# Patient Record
Sex: MALE | Race: WHITE | NOT HISPANIC OR LATINO | URBAN - METROPOLITAN AREA
[De-identification: names, ages, dates, MRNs, and addresses within clinical notes are randomized per-mention and may not be internally consistent; named-entity substitution may affect disease eponyms.]

---

## 2018-05-12 ENCOUNTER — OUTPATIENT (OUTPATIENT)
Dept: OUTPATIENT SERVICES | Facility: HOSPITAL | Age: 73
LOS: 1 days | Discharge: HOME | End: 2018-05-12

## 2018-05-12 DIAGNOSIS — J44.9 CHRONIC OBSTRUCTIVE PULMONARY DISEASE, UNSPECIFIED: ICD-10-CM

## 2018-05-12 LAB
BASE EXCESS BLDA CALC-SCNC: 5.9 MMOL/L — HIGH (ref -2–2)
BLOOD GAS COMMENTS: SIGNIFICANT CHANGE UP
BLOOD GAS SOURCE: SIGNIFICANT CHANGE UP
HCO3 BLDA-SCNC: 31 MMOL/L — HIGH (ref 21–29)
HOROWITZ INDEX BLDA+IHG-RTO: 32 — SIGNIFICANT CHANGE UP
PCO2 BLDA: 49 MMHG — HIGH (ref 32–46)
PH BLD: 7.42 — SIGNIFICANT CHANGE UP (ref 7.35–7.45)
PO2 BLDA: 59 MMHG — LOW (ref 74–108)
SAO2 % BLDA: 91 % — LOW (ref 92–96)

## 2019-04-29 ENCOUNTER — INPATIENT (INPATIENT)
Facility: HOSPITAL | Age: 74
LOS: 6 days | Discharge: NURSING, REHAB OR LONG-TERM CARE | End: 2019-05-06
Attending: INTERNAL MEDICINE | Admitting: INTERNAL MEDICINE
Payer: MEDICARE

## 2019-04-29 VITALS
SYSTOLIC BLOOD PRESSURE: 144 MMHG | DIASTOLIC BLOOD PRESSURE: 84 MMHG | HEART RATE: 115 BPM | OXYGEN SATURATION: 97 % | TEMPERATURE: 98 F | WEIGHT: 164.02 LBS | RESPIRATION RATE: 18 BRPM

## 2019-04-29 LAB
ALBUMIN SERPL ELPH-MCNC: 3 G/DL — LOW (ref 3.3–5)
ALP SERPL-CCNC: 61 U/L — SIGNIFICANT CHANGE UP (ref 40–120)
ALT FLD-CCNC: 9 U/L — LOW (ref 12–78)
ANION GAP SERPL CALC-SCNC: 7 MMOL/L — SIGNIFICANT CHANGE UP (ref 5–17)
APTT BLD: 29.7 SEC — SIGNIFICANT CHANGE UP (ref 27.5–36.3)
AST SERPL-CCNC: 18 U/L — SIGNIFICANT CHANGE UP (ref 15–37)
BASOPHILS # BLD AUTO: 0.03 K/UL — SIGNIFICANT CHANGE UP (ref 0–0.2)
BASOPHILS NFR BLD AUTO: 0.4 % — SIGNIFICANT CHANGE UP (ref 0–2)
BILIRUB SERPL-MCNC: 0.2 MG/DL — SIGNIFICANT CHANGE UP (ref 0.2–1.2)
BUN SERPL-MCNC: 15 MG/DL — SIGNIFICANT CHANGE UP (ref 7–23)
CALCIUM SERPL-MCNC: 8.7 MG/DL — SIGNIFICANT CHANGE UP (ref 8.5–10.1)
CHLORIDE SERPL-SCNC: 104 MMOL/L — SIGNIFICANT CHANGE UP (ref 96–108)
CK MB BLD-MCNC: 5.7 % — HIGH (ref 0–3.5)
CK MB CFR SERPL CALC: 2.7 NG/ML — SIGNIFICANT CHANGE UP (ref 0.5–3.6)
CK SERPL-CCNC: 47 U/L — SIGNIFICANT CHANGE UP (ref 26–308)
CO2 SERPL-SCNC: 33 MMOL/L — HIGH (ref 22–31)
CREAT SERPL-MCNC: 0.88 MG/DL — SIGNIFICANT CHANGE UP (ref 0.5–1.3)
EOSINOPHIL # BLD AUTO: 0.37 K/UL — SIGNIFICANT CHANGE UP (ref 0–0.5)
EOSINOPHIL NFR BLD AUTO: 4.4 % — SIGNIFICANT CHANGE UP (ref 0–6)
GLUCOSE BLDC GLUCOMTR-MCNC: 108 MG/DL — HIGH (ref 70–99)
GLUCOSE SERPL-MCNC: 114 MG/DL — HIGH (ref 70–99)
HCT VFR BLD CALC: 40.1 % — SIGNIFICANT CHANGE UP (ref 39–50)
HGB BLD-MCNC: 12.3 G/DL — LOW (ref 13–17)
IMM GRANULOCYTES NFR BLD AUTO: 0.2 % — SIGNIFICANT CHANGE UP (ref 0–1.5)
INR BLD: 1 RATIO — SIGNIFICANT CHANGE UP (ref 0.88–1.16)
LYMPHOCYTES # BLD AUTO: 0.74 K/UL — LOW (ref 1–3.3)
LYMPHOCYTES # BLD AUTO: 8.8 % — LOW (ref 13–44)
MCHC RBC-ENTMCNC: 28 PG — SIGNIFICANT CHANGE UP (ref 27–34)
MCHC RBC-ENTMCNC: 30.7 GM/DL — LOW (ref 32–36)
MCV RBC AUTO: 91.1 FL — SIGNIFICANT CHANGE UP (ref 80–100)
MONOCYTES # BLD AUTO: 0.63 K/UL — SIGNIFICANT CHANGE UP (ref 0–0.9)
MONOCYTES NFR BLD AUTO: 7.5 % — SIGNIFICANT CHANGE UP (ref 2–14)
NEUTROPHILS # BLD AUTO: 6.58 K/UL — SIGNIFICANT CHANGE UP (ref 1.8–7.4)
NEUTROPHILS NFR BLD AUTO: 78.7 % — HIGH (ref 43–77)
NRBC # BLD: 0 /100 WBCS — SIGNIFICANT CHANGE UP (ref 0–0)
NT-PROBNP SERPL-SCNC: 810 PG/ML — HIGH (ref 0–450)
PLATELET # BLD AUTO: 217 K/UL — SIGNIFICANT CHANGE UP (ref 150–400)
POTASSIUM SERPL-MCNC: 4.3 MMOL/L — SIGNIFICANT CHANGE UP (ref 3.5–5.3)
POTASSIUM SERPL-SCNC: 4.3 MMOL/L — SIGNIFICANT CHANGE UP (ref 3.5–5.3)
PROT SERPL-MCNC: 6.5 GM/DL — SIGNIFICANT CHANGE UP (ref 6–8.3)
PROTHROM AB SERPL-ACNC: 11.2 SEC — SIGNIFICANT CHANGE UP (ref 10–12.9)
RBC # BLD: 4.4 M/UL — SIGNIFICANT CHANGE UP (ref 4.2–5.8)
RBC # FLD: 14.7 % — HIGH (ref 10.3–14.5)
SODIUM SERPL-SCNC: 144 MMOL/L — SIGNIFICANT CHANGE UP (ref 135–145)
TROPONIN I SERPL-MCNC: 0.16 NG/ML — HIGH (ref 0.01–0.04)
WBC # BLD: 8.37 K/UL — SIGNIFICANT CHANGE UP (ref 3.8–10.5)
WBC # FLD AUTO: 8.37 K/UL — SIGNIFICANT CHANGE UP (ref 3.8–10.5)

## 2019-04-29 PROCEDURE — 99285 EMERGENCY DEPT VISIT HI MDM: CPT

## 2019-04-29 PROCEDURE — 71275 CT ANGIOGRAPHY CHEST: CPT | Mod: 26

## 2019-04-29 PROCEDURE — 71045 X-RAY EXAM CHEST 1 VIEW: CPT | Mod: 26

## 2019-04-29 RX ORDER — ENOXAPARIN SODIUM 100 MG/ML
80 INJECTION SUBCUTANEOUS ONCE
Qty: 0 | Refills: 0 | Status: COMPLETED | OUTPATIENT
Start: 2019-04-29 | End: 2019-04-29

## 2019-04-29 RX ORDER — INSULIN LISPRO 100/ML
VIAL (ML) SUBCUTANEOUS
Qty: 0 | Refills: 0 | Status: DISCONTINUED | OUTPATIENT
Start: 2019-04-29 | End: 2019-05-06

## 2019-04-29 RX ORDER — SODIUM CHLORIDE 9 MG/ML
1000 INJECTION, SOLUTION INTRAVENOUS
Qty: 0 | Refills: 0 | Status: DISCONTINUED | OUTPATIENT
Start: 2019-04-29 | End: 2019-05-06

## 2019-04-29 RX ORDER — GLUCAGON INJECTION, SOLUTION 0.5 MG/.1ML
1 INJECTION, SOLUTION SUBCUTANEOUS ONCE
Qty: 0 | Refills: 0 | Status: DISCONTINUED | OUTPATIENT
Start: 2019-04-29 | End: 2019-05-06

## 2019-04-29 RX ORDER — DEXTROSE 50 % IN WATER 50 %
25 SYRINGE (ML) INTRAVENOUS ONCE
Qty: 0 | Refills: 0 | Status: DISCONTINUED | OUTPATIENT
Start: 2019-04-29 | End: 2019-05-06

## 2019-04-29 RX ORDER — INSULIN LISPRO 100/ML
VIAL (ML) SUBCUTANEOUS AT BEDTIME
Qty: 0 | Refills: 0 | Status: DISCONTINUED | OUTPATIENT
Start: 2019-04-29 | End: 2019-05-06

## 2019-04-29 RX ORDER — DEXTROSE 50 % IN WATER 50 %
12.5 SYRINGE (ML) INTRAVENOUS ONCE
Qty: 0 | Refills: 0 | Status: DISCONTINUED | OUTPATIENT
Start: 2019-04-29 | End: 2019-05-06

## 2019-04-29 RX ORDER — DEXTROSE 50 % IN WATER 50 %
15 SYRINGE (ML) INTRAVENOUS ONCE
Qty: 0 | Refills: 0 | Status: DISCONTINUED | OUTPATIENT
Start: 2019-04-29 | End: 2019-05-06

## 2019-04-29 RX ORDER — IPRATROPIUM/ALBUTEROL SULFATE 18-103MCG
3 AEROSOL WITH ADAPTER (GRAM) INHALATION EVERY 6 HOURS
Qty: 0 | Refills: 0 | Status: DISCONTINUED | OUTPATIENT
Start: 2019-04-29 | End: 2019-05-06

## 2019-04-29 RX ADMIN — ENOXAPARIN SODIUM 80 MILLIGRAM(S): 100 INJECTION SUBCUTANEOUS at 22:28

## 2019-04-29 NOTE — ED ADULT NURSE NOTE - NSIMPLEMENTINTERV_GEN_ALL_ED
Implemented All Fall Risk Interventions:  [place] to call system. Call bell, personal items and telephone within reach. Instruct patient to call for assistance. Room bathroom lighting operational. Non-slip footwear when patient is off stretcher. Physically safe environment: no spills, clutter or unnecessary equipment. Stretcher in lowest position, wheels locked, appropriate side rails in place. Provide visual cue, wrist band, yellow gown, etc. Monitor gait and stability. Monitor for mental status changes and reorient to person, place, and time. Review medications for side effects contributing to fall risk. Reinforce activity limits and safety measures with patient and family.

## 2019-04-29 NOTE — ED ADULT NURSE NOTE - OBJECTIVE STATEMENT
pt BIBA with c/o chest pain tachycardic in triage, given SL nitro x 1 0.4 with some relief, 325mg of ASA. Cardiac monitor in place. O2 dependent at home 3-4L

## 2019-04-29 NOTE — ED PROVIDER NOTE - MUSCULOSKELETAL, MLM
Spine appears normal, range of motion is not limited, no muscle or joint tenderness NO pitting edema no pain bilateral legs

## 2019-04-29 NOTE — ED PROVIDER NOTE - CARE PLAN
Principal Discharge DX:	Chest pain Principal Discharge DX:	Chest pain  Secondary Diagnosis:	Troponin level elevated

## 2019-04-29 NOTE — ED ADULT TRIAGE NOTE - CHIEF COMPLAINT QUOTE
pt BIBA with c/o chest pain tachycardic in triage, given SL nitro x 1 0.4 with some relief, 325mg of ASA

## 2019-04-29 NOTE — ED PROVIDER NOTE - PMH
Anxiety and depression    COPD with exacerbation    Hypercholesteremia    Hyperglycemia    Pneumonia

## 2019-04-29 NOTE — ED PROVIDER NOTE - OBJECTIVE STATEMENT
74 years old male here c.o bilateral chest pain sob, and cough with chest congestions for three days. Pt 74 years old male here c.o bilateral chest pain sob, and cough with chest congestions for three days. Pt denies headache, dizziness, blurred visions, light sensitivities, focal/distal weakness or numbness, cough, sob, chest pain, nausea, vomiting, fever, chills, abd pain.

## 2019-04-29 NOTE — ED PROVIDER NOTE - CLINICAL SUMMARY MEDICAL DECISION MAKING FREE TEXT BOX
hx, exam. Pt's labs, ct angio of chest and care are singed out to the next team. hx, exam. Pt's labs, ct angio of chest

## 2019-04-30 DIAGNOSIS — J44.1 CHRONIC OBSTRUCTIVE PULMONARY DISEASE WITH (ACUTE) EXACERBATION: ICD-10-CM

## 2019-04-30 DIAGNOSIS — R74.8 ABNORMAL LEVELS OF OTHER SERUM ENZYMES: ICD-10-CM

## 2019-04-30 DIAGNOSIS — R07.9 CHEST PAIN, UNSPECIFIED: ICD-10-CM

## 2019-04-30 DIAGNOSIS — J18.9 PNEUMONIA, UNSPECIFIED ORGANISM: ICD-10-CM

## 2019-04-30 DIAGNOSIS — E11.9 TYPE 2 DIABETES MELLITUS WITHOUT COMPLICATIONS: ICD-10-CM

## 2019-04-30 DIAGNOSIS — F41.9 ANXIETY DISORDER, UNSPECIFIED: ICD-10-CM

## 2019-04-30 LAB
GLUCOSE BLDC GLUCOMTR-MCNC: 113 MG/DL — HIGH (ref 70–99)
GLUCOSE BLDC GLUCOMTR-MCNC: 118 MG/DL — HIGH (ref 70–99)
GLUCOSE BLDC GLUCOMTR-MCNC: 188 MG/DL — HIGH (ref 70–99)
GLUCOSE BLDC GLUCOMTR-MCNC: 80 MG/DL — SIGNIFICANT CHANGE UP (ref 70–99)
HBA1C BLD-MCNC: 6.2 % — HIGH (ref 4–5.6)
T3 SERPL-MCNC: 94 NG/DL — SIGNIFICANT CHANGE UP (ref 80–200)
T4 AB SER-ACNC: 7.4 UG/DL — SIGNIFICANT CHANGE UP (ref 4.6–12)
TROPONIN I SERPL-MCNC: 0.22 NG/ML — HIGH (ref 0.01–0.04)
TROPONIN I SERPL-MCNC: 0.32 NG/ML — HIGH (ref 0.01–0.04)
TROPONIN I SERPL-MCNC: 0.43 NG/ML — HIGH (ref 0.01–0.04)
TROPONIN I SERPL-MCNC: 0.43 NG/ML — HIGH (ref 0.01–0.04)
TSH SERPL-MCNC: 1.1 UU/ML — SIGNIFICANT CHANGE UP (ref 0.36–3.74)

## 2019-04-30 PROCEDURE — 93010 ELECTROCARDIOGRAM REPORT: CPT

## 2019-04-30 RX ORDER — ATORVASTATIN CALCIUM 80 MG/1
40 TABLET, FILM COATED ORAL AT BEDTIME
Qty: 0 | Refills: 0 | Status: DISCONTINUED | OUTPATIENT
Start: 2019-04-30 | End: 2019-05-06

## 2019-04-30 RX ORDER — CEFTRIAXONE 500 MG/1
1 INJECTION, POWDER, FOR SOLUTION INTRAMUSCULAR; INTRAVENOUS EVERY 24 HOURS
Qty: 0 | Refills: 0 | Status: DISCONTINUED | OUTPATIENT
Start: 2019-05-01 | End: 2019-05-06

## 2019-04-30 RX ORDER — AZITHROMYCIN 500 MG/1
500 TABLET, FILM COATED ORAL DAILY
Qty: 0 | Refills: 0 | Status: DISCONTINUED | OUTPATIENT
Start: 2019-04-30 | End: 2019-05-06

## 2019-04-30 RX ORDER — METOPROLOL TARTRATE 50 MG
25 TABLET ORAL DAILY
Qty: 0 | Refills: 0 | Status: DISCONTINUED | OUTPATIENT
Start: 2019-04-30 | End: 2019-05-06

## 2019-04-30 RX ORDER — ENOXAPARIN SODIUM 100 MG/ML
40 INJECTION SUBCUTANEOUS DAILY
Qty: 0 | Refills: 0 | Status: DISCONTINUED | OUTPATIENT
Start: 2019-04-30 | End: 2019-05-06

## 2019-04-30 RX ORDER — CEFTRIAXONE 500 MG/1
1 INJECTION, POWDER, FOR SOLUTION INTRAMUSCULAR; INTRAVENOUS ONCE
Qty: 0 | Refills: 0 | Status: COMPLETED | OUTPATIENT
Start: 2019-04-30 | End: 2019-04-30

## 2019-04-30 RX ORDER — CEFTRIAXONE 500 MG/1
INJECTION, POWDER, FOR SOLUTION INTRAMUSCULAR; INTRAVENOUS
Qty: 0 | Refills: 0 | Status: DISCONTINUED | OUTPATIENT
Start: 2019-04-30 | End: 2019-05-06

## 2019-04-30 RX ORDER — PANTOPRAZOLE SODIUM 20 MG/1
40 TABLET, DELAYED RELEASE ORAL
Qty: 0 | Refills: 0 | Status: DISCONTINUED | OUTPATIENT
Start: 2019-04-30 | End: 2019-05-06

## 2019-04-30 RX ORDER — SERTRALINE 25 MG/1
25 TABLET, FILM COATED ORAL DAILY
Qty: 0 | Refills: 0 | Status: DISCONTINUED | OUTPATIENT
Start: 2019-04-30 | End: 2019-05-05

## 2019-04-30 RX ORDER — ALPRAZOLAM 0.25 MG
0.5 TABLET ORAL THREE TIMES A DAY
Qty: 0 | Refills: 0 | Status: DISCONTINUED | OUTPATIENT
Start: 2019-04-30 | End: 2019-05-06

## 2019-04-30 RX ADMIN — Medication 3 MILLILITER(S): at 23:37

## 2019-04-30 RX ADMIN — SERTRALINE 25 MILLIGRAM(S): 25 TABLET, FILM COATED ORAL at 11:10

## 2019-04-30 RX ADMIN — ENOXAPARIN SODIUM 40 MILLIGRAM(S): 100 INJECTION SUBCUTANEOUS at 11:10

## 2019-04-30 RX ADMIN — Medication 25 MILLIGRAM(S): at 18:48

## 2019-04-30 RX ADMIN — CEFTRIAXONE 100 GRAM(S): 500 INJECTION, POWDER, FOR SOLUTION INTRAMUSCULAR; INTRAVENOUS at 10:12

## 2019-04-30 RX ADMIN — Medication 40 MILLIGRAM(S): at 15:21

## 2019-04-30 RX ADMIN — Medication 3 MILLILITER(S): at 05:58

## 2019-04-30 RX ADMIN — Medication 3 MILLILITER(S): at 11:19

## 2019-04-30 RX ADMIN — Medication 3 MILLILITER(S): at 00:45

## 2019-04-30 RX ADMIN — Medication 40 MILLIGRAM(S): at 21:20

## 2019-04-30 RX ADMIN — AZITHROMYCIN 500 MILLIGRAM(S): 500 TABLET, FILM COATED ORAL at 11:10

## 2019-04-30 RX ADMIN — Medication 3 MILLILITER(S): at 17:43

## 2019-04-30 RX ADMIN — ATORVASTATIN CALCIUM 40 MILLIGRAM(S): 80 TABLET, FILM COATED ORAL at 21:20

## 2019-04-30 RX ADMIN — Medication 0.5 MILLIGRAM(S): at 21:20

## 2019-04-30 NOTE — H&P ADULT - NSHPLABSRESULTS_GEN_ALL_CORE
LABS:                        12.3   8.37  )-----------( 217      ( 29 Apr 2019 18:48 )             40.1     04-29    144  |  104  |  15  ----------------------------<  114<H>  4.3   |  33<H>  |  0.88    Ca    8.7      29 Apr 2019 18:48    TPro  6.5  /  Alb  3.0<L>  /  TBili  0.2  /  DBili  x   /  AST  18  /  ALT  9<L>  /  AlkPhos  61  04-29    PT/INR - ( 29 Apr 2019 18:48 )   PT: 11.2 sec;   INR: 1.00 ratio         PTT - ( 29 Apr 2019 18:48 )  PTT:29.7 sec

## 2019-04-30 NOTE — CONSULT NOTE ADULT - SUBJECTIVE AND OBJECTIVE BOX
Patient is a 74y old  Male who presents with a chief complaint of chest  pain  copd  excerrbation (30 Apr 2019 08:16)    HPI:  74 year male with HLD, DM, COPD on home O2, Anxiety and depression. Came with 2 days of sob and chest pain with cough at times. Also reports chills but denies fever, thinks may have started as cold. Admitted with RLL pneumonia and elevated troponin.  Smoked close to 50 years, quit 10 years ago.    PAST MEDICAL & SURGICAL HISTORY:  Hyperglycemia  COPD with exacerbation  Hypercholesteremia  Pneumonia  Anxiety and depression  No significant past surgical history    FAMILY HISTORY: denies    SOCIAL HISTORY: BMI (kg/m2): 24.3 .    Allergies  No Known Allergies    MEDICATIONS  (STANDING):  ALBUTerol/ipratropium for Nebulization 3 milliLiter(s) Nebulizer every 6 hours  atorvastatin 40 milliGRAM(s) Oral at bedtime  azithromycin   Tablet 500 milliGRAM(s) Oral daily  cefTRIAXone   IVPB      dextrose 5%. 1000 milliLiter(s) (50 mL/Hr) IV Continuous <Continuous>  dextrose 50% Injectable 12.5 Gram(s) IV Push once  dextrose 50% Injectable 25 Gram(s) IV Push once  dextrose 50% Injectable 25 Gram(s) IV Push once  enoxaparin Injectable 40 milliGRAM(s) SubCutaneous daily  insulin lispro (HumaLOG) corrective regimen sliding scale   SubCutaneous three times a day before meals  insulin lispro (HumaLOG) corrective regimen sliding scale   SubCutaneous at bedtime  methylPREDNISolone sodium succinate Injectable 40 milliGRAM(s) IV Push three times a day  metoprolol succinate ER 25 milliGRAM(s) Oral daily  pantoprazole    Tablet 40 milliGRAM(s) Oral before breakfast  sertraline 25 milliGRAM(s) Oral daily    MEDICATIONS  (PRN):  ALPRAZolam 0.5 milliGRAM(s) Oral three times a day PRN anxiety  dextrose 40% Gel 15 Gram(s) Oral once PRN Blood Glucose LESS THAN 70 milliGRAM(s)/deciliter  glucagon  Injectable 1 milliGRAM(s) IntraMuscular once PRN Glucose LESS THAN 70 milligrams/deciliter    REVIEW OF SYSTEMS:    Constitutional:            No fever, weight loss or fatigue  HEENT:                     No difficulty hearing, tinnitus, vertigo; No sinus or throat pain  Respiratory:               sob and cough.  Cardiovascular:          chest pain,   Gastrointestinal:        No abdominal or epigastric pain. No N/V/diarrhea or hematemesis  Genitourinary:            No dysuria, frequency, hematuria or incontinence  SKIN:                         no rash  Musculoskeletal:        No joint pain or swelling  Extremities:                No swelling  Neurological:              headaches  Psychiatric:                 depression, anxiety history.    MACRA & MIPS:  Vaccines - Influenza: yes and Pneumovax: yes  Tobacco: Ex smoker  Depression:  history  Colorectal Screening:  Blood Pressure Screening / Control of: 162/94  Current Medications Reviewed:  yes    Vital Signs Last 24 Hrs  T(C): 36.4 (30 Apr 2019 05:35), Max: 37.3 (29 Apr 2019 23:54)  T(F): 97.6 (30 Apr 2019 05:35), Max: 99.2 (29 Apr 2019 23:54)  HR: 102 (30 Apr 2019 06:29) (102 - 115)  BP: 139/80 (30 Apr 2019 05:35) (139/80 - 168/92)  BP(mean): --  RR: 18 (30 Apr 2019 05:35) (18 - 20)  SpO2: 98% (30 Apr 2019 06:29) (97% - 99%)    PHYSICAL EXAM:  GEN:         Awake, responsive and comfortable.  HEENT:    Normal.    RESP:       decreased air entry.  CVS:         Regular rate and rhythm.   ABD:         Soft, non-tender, non-distended;   :             No costovertebral angle tenderness  SKIN:           Warm and dry.  EXTR:            No clubbing, cyanosis or edema  CNS:              Intact sensory and motor function.  PSYCH:        cooperative, no anxiety or depression    LABS:                        12.3   8.37  )-----------( 217      ( 29 Apr 2019 18:48 )             40.1     04-29    144  |  104  |  15  ----------------------------<  114<H>  4.3   |  33<H>  |  0.88    Ca    8.7      29 Apr 2019 18:48    TPro  6.5  /  Alb  3.0<L>  /  TBili  0.2  /  DBili  x   /  AST  18  /  ALT  9<L>  /  AlkPhos  61  04-29    PT/INR - ( 29 Apr 2019 18:48 )   PT: 11.2 sec;   INR: 1.00 ratio      PTT - ( 29 Apr 2019 18:48 )  PTT:29.7 sec    EKG: sinus rhythm    RADIOLOGY & ADDITIONAL STUDIES:  < from: CT Angio Chest w/ IV Cont (04.29.19 @ 21:36) >  ******PRELIMINARY REPORT******    ******PRELIMINARY REPORT******          EXAM:  CT ANGIO CHEST (W)AW IC                          PROCEDURE DATE:  04/29/2019    ******PRELIMINARY REPORT******    ******PRELIMINARY REPORT******          INTERPRETATION:  [facility] PRELIMINARY REPORT    EXAM:    CT Angiography Chest With Contrast     EXAM DATE/TIME:    4/29/2019 8:51 PM     CLINICAL HISTORY:    74 years old, male; Signs and symptoms; Shortness of breath     TECHNIQUE:    Imaging protocol: Axial computed tomographic angiography images of the   chest   with intravenous contrast using CT angiography protocol. Coronal and   sagittal   reformatted images were created and reviewed.    3D rendering: MIP reconstructed images were created and reviewed.    Radiation optimization: All CT scans at this facility use least one of   these   dose optimization techniques: automated exposure control; mA and/or kV   adjustment per patient size (includes targeted exams where dose is   matched to   clinical indication); or iterative reconstruction.    Contrast material: [id number]; Contrast volume: 90 ml; Contrast route: IV;     COMPARISON:    CT ANGIO CHEST WITHOUT AND OR WITH IV CONTRAST 6/5/2014 5:25 AM     FINDINGS:    Pulmonary arteries: Enlarged pulmonary trunk, main and central branches   suggest pulmonary arterial hypertension.    Aorta: Atherosclerotic ulceration into the posteriolateral wall of the   descending thoracic aorta (series 603 image 44) very similar to 2014..    Lungs: Upper lobe predominant pulmonary emphysema. 1.5 cm right upper   lobe   pulmonary nodule also unchanged. Atelectasis and consolidation suggesting   pneumonia in right lung base..    Pleural space: Pleural-parenchymal scarring in the posterior right lung   apex   similar to 2014..    Heart: Extensive coronary artery calcification.    Lymph nodes: No adenopathy.    Bones/joints: No acute fractures.    Soft tissues: Unremarkable.     IMPRESSION:   1. Pulmonary emphysema, pleural and parenchymal scarring.   2. 1.5 cm pulmonary nodule stable for 5 years.   3. Right lung base consolidation suggesting pneumonia.   4. Severe coronary disease.   5. Secondary signs of pulmonary arterial hypertension.   6. No pulmonary embolism.   7. Atherosclerotic ulceration in descending thoracic aorta similar to   2014..      ******PRELIMINARY REPORT******    ******PRELIMINARY REPORT******          NAEEM KENDALL M.D.;[facility] RADI[facility]    ASSESSMENT AND PLAN:  ·	SOB.  ·	RLL Pneumonia, CAP.  ·	Acute COPD exacerbation.  ·	Chronic Hypoxic Respiratory failure.  ·	Elevated troponin, seen by Cardiology.  ·	DM.  ·	HLD.  ·	Anxiety and Depression.    Continue O2, nebulizer, steroids and antibiotics.  Seen by Cardiology, follow echocardiogram.  DVT prophylaxis.  Discussed with wife at bed side.

## 2019-04-30 NOTE — H&P ADULT - NSICDXPASTMEDICALHX_GEN_ALL_CORE_FT
PAST MEDICAL HISTORY:  Anxiety and depression     COPD with exacerbation     Hypercholesteremia     Hyperglycemia     Pneumonia

## 2019-04-30 NOTE — CONSULT NOTE ADULT - SUBJECTIVE AND OBJECTIVE BOX
HPI:  HPI:      Chief Complaint:  Patient is a 74y old  Male who presents with a chief complaint of   CP  Review of Systems:    General:  No wt loss, fevers, chills, night sweats  Eyes:  Good vision, no reported pain  ENT:  No sore throat, pain, runny nose, dysphagia  CV: pain,   Resp:  No dyspnea, cough, tachypnea, wheezing  GI:  No pain, nausea, vomiting, diarrhea, constipation  :  No pain, bleeding, incontinence, nocturia  Muscle:  No pain, weakness  Breast:  No pain, abscess, mass, discharge  Neuro:  No weakness, tingling, memory problems  Psych:  No fatigue, insomnia, mood problems, depression  Endocrine:  No polyuria, polydypsia, cold/heat intolerance  Heme:  No petechiae, ecchymosis, easy bruisability  Skin:  No rash, tattoos, scars, edema         Social History/Family History  SOCHX:   tobacco,  -  alcohol    FMHX: FA/MO  - contributory       Discussed with:  PMD, Family    Physical Exam:    Vital Signs:  Vital Signs Last 24 Hrs  T(C): 37.3 (29 Apr 2019 23:54), Max: 37.3 (29 Apr 2019 23:54)  T(F): 99.2 (29 Apr 2019 23:54), Max: 99.2 (29 Apr 2019 23:54)  HR: 102 (29 Apr 2019 23:54) (102 - 115)  BP: 168/92 (29 Apr 2019 23:54) (140/87 - 168/92)  BP(mean): --  RR: 20 (29 Apr 2019 23:54) (18 - 20)  SpO2: 99% (29 Apr 2019 23:54) (97% - 99%)  Daily Height in cm: 172.72 (29 Apr 2019 23:54)    Daily   I&O's Summary        HEENT:  NC/AT, patent nares w/ pink mucosa, OP clear w/o lesions, PERRL, EOMI, conjunctivae clear, no thyromegaly, nodules, adenopathy, no JVD  Chest:  Full & symmetric excursion, no increased effort, breath sounds clear  Cardiovascular:  Regular rhythm, S1, S2, no murmur/rub/S3/S4, no carotid/femoral/abdominal bruit, radial/pedal pulses 2+, no edema  Abdomen:  Soft, non-tender, non-distended, normoactive bowel sounds, no HSM    Laboratory:                          12.3   8.37  )-----------( 217      ( 29 Apr 2019 18:48 )             40.1     04-29    144  |  104  |  15  ----------------------------<  114<H>  4.3   |  33<H>  |  0.88    Ca    8.7      29 Apr 2019 18:48    TPro  6.5  /  Alb  3.0<L>  /  TBili  0.2  /  DBili  x   /  AST  18  /  ALT  9<L>  /  AlkPhos  61  04-29      CARDIAC MARKERS ( 29 Apr 2019 18:48 )  .158 ng/mL / x     / 47 U/L / x     / 2.7 ng/mL      CAPILLARY BLOOD GLUCOSE      POCT Blood Glucose.: 108 mg/dL (29 Apr 2019 22:24)    LIVER FUNCTIONS - ( 29 Apr 2019 18:48 )  Alb: 3.0 g/dL / Pro: 6.5 gm/dL / ALK PHOS: 61 U/L / ALT: 9 U/L / AST: 18 U/L / GGT: x           PT/INR - ( 29 Apr 2019 18:48 )   PT: 11.2 sec;   INR: 1.00 ratio         PTT - ( 29 Apr 2019 18:48 )  PTT:29.7 sec        Assessment:  CP  CE  TTE only for now  CPK and EKG w/o change  Risk factors reviewed

## 2019-04-30 NOTE — H&P ADULT - HISTORY OF PRESENT ILLNESS
patient  c/o  cough  chest pain  ER  found  to  have  elevated  troponin  admitted  for  further  w/u  and  treatment  discussed with  ER  MD 7:40 pm , he   had  discussed with cardiology patient  was going  to  have  ct/angio   of chest patient  to  be  admitted  yo  my  service  by  hospitalist.,  was called 9;54 pm by  ER  RN for  admission orders again  advised  that  patient  was  to have been  admitted to my  service  by  Hospitalist   as  per  protocol. patient  admitted  by  NP  Walker to  telemetry  presently   anxious  no  CP  mild  SOB

## 2019-04-30 NOTE — H&P ADULT - NSHPPHYSICALEXAM_GEN_ALL_CORE
PHYSICAL EXAM:    GENERAL: NAD, well-groomed, well-developed  HEAD:  Atraumatic, Normocephalic  EYES: EOMI, PERRLA, conjunctiva and sclera clear  ENMT: No tonsillar erythema, exudates, or enlargement; Moist mucous membranes, , No lesions  NECK: Supple, No JVD, Normal thyroid  NERVOUS SYSTEM:  Alert & Oriented X4, ; Motor Strength 5/5 B/L upper and lower extremities; DTRs 2+ intact and symmetric  CHEST/LUNG: decreased  bs  bilaterallly  HEART: Regular rate and rhythm; No murmurs, rubs, or gallops  ABDOMEN: Soft, Nontender, Nondistended; no  masses Bowel sounds present  EXTREMITIES:   1+  edema  LYMPH: No lymphadenopathy noted   RECTAL: deferred  SKIN: No rashes or lesions

## 2019-04-30 NOTE — H&P ADULT - ASSESSMENT
IMPROVE VTE Individual Risk Assessment    RISK                                                                Points    [  ] Previous VTE                                                  3    [  ] Thrombophilia                                               2    [  ] Lower limb paralysis                                      2        (unable to hold up >15 seconds)      [  ] Current Cancer                                              2         (within 6 months)    [ x ] Immobilization > 24 hrs                                1    [  ] ICU/CCU stay > 24 hours                              1    [ x ] Age > 60                                                      1    IMPROVE VTE Score _________    IMPROVE Score 0-1: Low Risk, No VTE prophylaxis required for most patients, encourage ambulation.   IMPROVE Score 2-3: At risk, pharmacologic VTE prophylaxis is indicated for most patients (in the absence of a contraindication)  IMPROVE Score > or = 4: High Risk, pharmacologic VTE prophylaxis is indicated for most patients (in the absence of a contraindication)

## 2019-05-01 LAB
ALBUMIN SERPL ELPH-MCNC: 2.8 G/DL — LOW (ref 3.3–5)
ALP SERPL-CCNC: 53 U/L — SIGNIFICANT CHANGE UP (ref 40–120)
ALT FLD-CCNC: 12 U/L — SIGNIFICANT CHANGE UP (ref 12–78)
ANION GAP SERPL CALC-SCNC: 9 MMOL/L — SIGNIFICANT CHANGE UP (ref 5–17)
AST SERPL-CCNC: 17 U/L — SIGNIFICANT CHANGE UP (ref 15–37)
BILIRUB SERPL-MCNC: 0.3 MG/DL — SIGNIFICANT CHANGE UP (ref 0.2–1.2)
BUN SERPL-MCNC: 17 MG/DL — SIGNIFICANT CHANGE UP (ref 7–23)
CALCIUM SERPL-MCNC: 8.8 MG/DL — SIGNIFICANT CHANGE UP (ref 8.5–10.1)
CHLORIDE SERPL-SCNC: 100 MMOL/L — SIGNIFICANT CHANGE UP (ref 96–108)
CO2 SERPL-SCNC: 30 MMOL/L — SIGNIFICANT CHANGE UP (ref 22–31)
CREAT SERPL-MCNC: 0.55 MG/DL — SIGNIFICANT CHANGE UP (ref 0.5–1.3)
GLUCOSE BLDC GLUCOMTR-MCNC: 166 MG/DL — HIGH (ref 70–99)
GLUCOSE BLDC GLUCOMTR-MCNC: 167 MG/DL — HIGH (ref 70–99)
GLUCOSE BLDC GLUCOMTR-MCNC: 173 MG/DL — HIGH (ref 70–99)
GLUCOSE BLDC GLUCOMTR-MCNC: 178 MG/DL — HIGH (ref 70–99)
GLUCOSE BLDC GLUCOMTR-MCNC: 181 MG/DL — HIGH (ref 70–99)
GLUCOSE SERPL-MCNC: 170 MG/DL — HIGH (ref 70–99)
HCT VFR BLD CALC: 38.9 % — LOW (ref 39–50)
HGB BLD-MCNC: 12.1 G/DL — LOW (ref 13–17)
MCHC RBC-ENTMCNC: 27.6 PG — SIGNIFICANT CHANGE UP (ref 27–34)
MCHC RBC-ENTMCNC: 31.1 GM/DL — LOW (ref 32–36)
MCV RBC AUTO: 88.6 FL — SIGNIFICANT CHANGE UP (ref 80–100)
NRBC # BLD: 0 /100 WBCS — SIGNIFICANT CHANGE UP (ref 0–0)
PLATELET # BLD AUTO: 224 K/UL — SIGNIFICANT CHANGE UP (ref 150–400)
POTASSIUM SERPL-MCNC: 4.2 MMOL/L — SIGNIFICANT CHANGE UP (ref 3.5–5.3)
POTASSIUM SERPL-SCNC: 4.2 MMOL/L — SIGNIFICANT CHANGE UP (ref 3.5–5.3)
PROT SERPL-MCNC: 6.5 GM/DL — SIGNIFICANT CHANGE UP (ref 6–8.3)
RBC # BLD: 4.39 M/UL — SIGNIFICANT CHANGE UP (ref 4.2–5.8)
RBC # FLD: 14.4 % — SIGNIFICANT CHANGE UP (ref 10.3–14.5)
SODIUM SERPL-SCNC: 139 MMOL/L — SIGNIFICANT CHANGE UP (ref 135–145)
TROPONIN I SERPL-MCNC: 0.12 NG/ML — HIGH (ref 0.01–0.04)
WBC # BLD: 4.74 K/UL — SIGNIFICANT CHANGE UP (ref 3.8–10.5)
WBC # FLD AUTO: 4.74 K/UL — SIGNIFICANT CHANGE UP (ref 3.8–10.5)

## 2019-05-01 PROCEDURE — 93010 ELECTROCARDIOGRAM REPORT: CPT

## 2019-05-01 RX ORDER — SODIUM CHLORIDE 0.65 %
1 AEROSOL, SPRAY (ML) NASAL EVERY 6 HOURS
Qty: 0 | Refills: 0 | Status: DISCONTINUED | OUTPATIENT
Start: 2019-05-01 | End: 2019-05-06

## 2019-05-01 RX ADMIN — Medication 2: at 12:56

## 2019-05-01 RX ADMIN — Medication 2: at 16:38

## 2019-05-01 RX ADMIN — Medication 40 MILLIGRAM(S): at 13:47

## 2019-05-01 RX ADMIN — Medication 3 MILLILITER(S): at 06:10

## 2019-05-01 RX ADMIN — Medication 2: at 08:28

## 2019-05-01 RX ADMIN — AZITHROMYCIN 500 MILLIGRAM(S): 500 TABLET, FILM COATED ORAL at 12:58

## 2019-05-01 RX ADMIN — SERTRALINE 25 MILLIGRAM(S): 25 TABLET, FILM COATED ORAL at 12:58

## 2019-05-01 RX ADMIN — Medication 25 MILLIGRAM(S): at 06:06

## 2019-05-01 RX ADMIN — Medication 40 MILLIGRAM(S): at 22:24

## 2019-05-01 RX ADMIN — Medication 3 MILLILITER(S): at 11:11

## 2019-05-01 RX ADMIN — PANTOPRAZOLE SODIUM 40 MILLIGRAM(S): 20 TABLET, DELAYED RELEASE ORAL at 06:05

## 2019-05-01 RX ADMIN — Medication 40 MILLIGRAM(S): at 06:05

## 2019-05-01 RX ADMIN — Medication 3 MILLILITER(S): at 17:12

## 2019-05-01 RX ADMIN — ENOXAPARIN SODIUM 40 MILLIGRAM(S): 100 INJECTION SUBCUTANEOUS at 12:58

## 2019-05-01 RX ADMIN — CEFTRIAXONE 100 GRAM(S): 500 INJECTION, POWDER, FOR SOLUTION INTRAMUSCULAR; INTRAVENOUS at 08:49

## 2019-05-01 RX ADMIN — ATORVASTATIN CALCIUM 40 MILLIGRAM(S): 80 TABLET, FILM COATED ORAL at 22:23

## 2019-05-01 NOTE — PHYSICAL THERAPY INITIAL EVALUATION ADULT - ADDITIONAL COMMENTS
Patient lives in a house with his wife and 6 steps to enter with L handrail.  Patient ambulates in house hold independently, but uses a rollator for community ambulation.  Patient is independent with ADLs.

## 2019-05-01 NOTE — PHYSICAL THERAPY INITIAL EVALUATION ADULT - BALANCE TRAINING, PT EVAL
Pt will increase static/dynamic standing balance to WFL to perform all functional mobility without LOB, by 2 weeks.

## 2019-05-01 NOTE — PHYSICAL THERAPY INITIAL EVALUATION ADULT - TRANSFER TRAINING, PT EVAL
Pt will independently perform sit to/from stand transfers without LOB using rolling walker by 2-3 days.

## 2019-05-01 NOTE — PROGRESS NOTE ADULT - SUBJECTIVE AND OBJECTIVE BOX
INTERVAL HPI/OVERNIGHT EVENTS:        REVIEW OF SYSTEMS:  CONSTITUTIONAL: feels  bwtter no  complaints    NECK: No pain or stiffnes  RESPIRATORY: No SOB   CARDIOVASCULAR: No chest pain, palpitations, dizziness,   GASTROINTESTINAL: No abdominal pain. No nausea, vomiting,   NEUROLOGICAL: No headaches, no  blurry  vision no  dizziness  SKIN: No itching,   MUSCULOSKELETAL: No pain    MEDICATION:  ALBUTerol/ipratropium for Nebulization 3 milliLiter(s) Nebulizer every 6 hours  ALPRAZolam 0.5 milliGRAM(s) Oral three times a day PRN  atorvastatin 40 milliGRAM(s) Oral at bedtime  azithromycin   Tablet 500 milliGRAM(s) Oral daily  cefTRIAXone   IVPB 1 Gram(s) IV Intermittent every 24 hours  cefTRIAXone   IVPB      dextrose 40% Gel 15 Gram(s) Oral once PRN  dextrose 5%. 1000 milliLiter(s) IV Continuous <Continuous>  dextrose 50% Injectable 12.5 Gram(s) IV Push once  dextrose 50% Injectable 25 Gram(s) IV Push once  dextrose 50% Injectable 25 Gram(s) IV Push once  enoxaparin Injectable 40 milliGRAM(s) SubCutaneous daily  glucagon  Injectable 1 milliGRAM(s) IntraMuscular once PRN  insulin lispro (HumaLOG) corrective regimen sliding scale   SubCutaneous three times a day before meals  insulin lispro (HumaLOG) corrective regimen sliding scale   SubCutaneous at bedtime  methylPREDNISolone sodium succinate Injectable 40 milliGRAM(s) IV Push three times a day  metoprolol succinate ER 25 milliGRAM(s) Oral daily  pantoprazole    Tablet 40 milliGRAM(s) Oral before breakfast  sertraline 25 milliGRAM(s) Oral daily    Vital Signs Last 24 Hrs  T(C): 36.3 (01 May 2019 05:54), Max: 37.1 (30 Apr 2019 17:00)  T(F): 97.3 (01 May 2019 05:54), Max: 98.8 (30 Apr 2019 17:00)  HR: 101 (01 May 2019 05:54) (101 - 123)  BP: 129/92 (01 May 2019 05:54) (129/92 - 153/94)  BP(mean): --  RR: 18 (01 May 2019 05:54) (18 - 20)  SpO2: 96% (01 May 2019 05:54) (94% - 96%)    PHYSICAL EXAM:  GENERAL: NAD, well-groomed, well-developed  EYES:  conjunctiva and sclera clear  ENMT:  Moist mucous membranes,   NECK: Supple, No JVD, Normal thyroid  NERVOUS SYSTEM:  Alert oriented   no  focal  deficits;   CHEST/LUNG: no  wheezing  HEART: Regular rate and rhythm; No murmurs, rubs, or gallops  ABDOMEN: Soft, Nontender, Nondistended; Bowel sounds present  EXTREMITIES:  no  edema no  tenderness  SKIN: No rashes   LABS:                        12.1   4.74  )-----------( 224      ( 01 May 2019 06:28 )             38.9     05-01    139  |  100  |  17  ----------------------------<  170<H>  4.2   |  30  |  0.55    Ca    8.8      01 May 2019 06:28    TPro  6.5  /  Alb  2.8<L>  /  TBili  0.3  /  DBili  x   /  AST  17  /  ALT  12  /  AlkPhos  53  05-01    PT/INR - ( 29 Apr 2019 18:48 )   PT: 11.2 sec;   INR: 1.00 ratio         PTT - ( 29 Apr 2019 18:48 )  PTT:29.7 sec    CAPILLARY BLOOD GLUCOSE      POCT Blood Glucose.: 173 mg/dL (01 May 2019 07:33)  POCT Blood Glucose.: 188 mg/dL (30 Apr 2019 21:18)  POCT Blood Glucose.: 118 mg/dL (30 Apr 2019 16:28)  POCT Blood Glucose.: 113 mg/dL (30 Apr 2019 11:16)      RADIOLOGY & ADDITIONAL TESTS:    Imaging reports  Personally Reviewed:  [x ] YES  [ ] NO    Consultant(s) Notes Reviewed:  [x ] YES  [ ] NO    Care Discussed with Consultants/Other Providers [ x] YES  [ ] NO  Problem/Plan - 1:  ·  Problem: COPD with exacerbation.  Plan: bronchodilators  steroids  AB  dvt  prophylaxis  pulmonary  consult.     Problem/Plan - 2:  ·  Problem: Pneumonia.  Plan: as  above.     Problem/Plan - 3:  ·  Problem: Troponin level elevated.  Plan: probable  spillage monitor  troponins cardio  eval  TTE  ST  as  per clinical  course.     Problem/Plan - 4:  ·  Problem: Chest pain.  Plan: tylenol  xanax.     Problem/Plan - 5:  ·  Problem: Anxiety.  Plan: xanax  zoloft.     Problem/Plan - 6:  Problem: Diabetes. Plan: insulin  coverage.

## 2019-05-01 NOTE — PROGRESS NOTE ADULT - SUBJECTIVE AND OBJECTIVE BOX
74y old  Male who presents with a chief complaint of   CP  Review of Systems:    General:  No wt loss, fevers, chills, night sweats  Eyes:  Good vision, no reported pain  ENT:  No sore throat, pain, runny nose, dysphagia  CV: pain,   Resp:  No dyspnea, cough, tachypnea, wheezing  GI:  No pain, nausea, vomiting, diarrhea, constipation  :  No pain, bleeding, incontinence, nocturia  Muscle:  No pain, weakness  Breast:  No pain, abscess, mass, discharge  Neuro:  No weakness, tingling, memory problems  Psych:  No fatigue, insomnia, mood problems, depression  Endocrine:  No polyuria, polydypsia, cold/heat intolerance  Heme:  No petechiae, ecchymosis, easy bruisability  Skin:  No rash, tattoos, scars, edema         Social History/Family History  SOCHX:   tobacco,  -  alcohol    FMHX: FA/MO  - contributory       Discussed with:  PMD, Family    Physical Exam:    Vital Signs:  Vital Signs Last 24 Hrs  T(C): 37.3 (29 Apr 2019 23:54), Max: 37.3 (29 Apr 2019 23:54)  T(F): 99.2 (29 Apr 2019 23:54), Max: 99.2 (29 Apr 2019 23:54)  HR: 102 (29 Apr 2019 23:54) (102 - 115)  BP: 168/92 (29 Apr 2019 23:54) (140/87 - 168/92)  BP(mean): --  RR: 20 (29 Apr 2019 23:54) (18 - 20)  SpO2: 99% (29 Apr 2019 23:54) (97% - 99%)  Daily Height in cm: 172.72 (29 Apr 2019 23:54)    Daily   I&O's Summary        HEENT:  NC/AT, patent nares w/ pink mucosa, OP clear w/o lesions, PERRL, EOMI, conjunctivae clear, no thyromegaly, nodules, adenopathy, no JVD  Chest:  Full & symmetric excursion, no increased effort, breath sounds clear  Cardiovascular:  Regular rhythm, S1, S2, no murmur/rub/S3/S4, no carotid/femoral/abdominal bruit, radial/pedal pulses 2+, no edema  Abdomen:  Soft, non-tender, non-distended, normoactive bowel sounds, no HSM    Laboratory:                          12.3   8.37  )-----------( 217      ( 29 Apr 2019 18:48 )             40.1     04-29    144  |  104  |  15  ----------------------------<  114<H>  4.3   |  33<H>  |  0.88    Ca    8.7      29 Apr 2019 18:48    TPro  6.5  /  Alb  3.0<L>  /  TBili  0.2  /  DBili  x   /  AST  18  /  ALT  9<L>  /  AlkPhos  61  04-29      CARDIAC MARKERS ( 29 Apr 2019 18:48 )  .158 ng/mL / x     / 47 U/L / x     / 2.7 ng/mL      CAPILLARY BLOOD GLUCOSE      POCT Blood Glucose.: 108 mg/dL (29 Apr 2019 22:24)    LIVER FUNCTIONS - ( 29 Apr 2019 18:48 )  Alb: 3.0 g/dL / Pro: 6.5 gm/dL / ALK PHOS: 61 U/L / ALT: 9 U/L / AST: 18 U/L / GGT: x           PT/INR - ( 29 Apr 2019 18:48 )   PT: 11.2 sec;   INR: 1.00 ratio         PTT - ( 29 Apr 2019 18:48 )  PTT:29.7 sec        Assessment:  CP improving  CE noted.  TTE essentially normal.  CPK and EKG w/o change  Risk factors reviewed  pulmonary management continues

## 2019-05-01 NOTE — PROGRESS NOTE ADULT - SUBJECTIVE AND OBJECTIVE BOX
INTERVAL HPI:  74 year male with HLD, DM, COPD on home O2, Anxiety and depression. Came with 2 days of sob and chest pain with cough at times. Also reports chills but denies fever, thinks may have started as cold. Admitted with RLL pneumonia and elevated troponin.  Smoked close to 50 years, quit 10 years ago.    OVERNIGHT EVENTS:  Awake, comfortable and feels better.    Vital Signs Last 24 Hrs  T(C): 36.7 (01 May 2019 17:17), Max: 36.7 (01 May 2019 17:17)  T(F): 98.1 (01 May 2019 17:17), Max: 98.1 (01 May 2019 17:17)  HR: 104 (01 May 2019 18:20) (100 - 114)  BP: 151/73 (01 May 2019 17:17) (129/92 - 153/102)  BP(mean): --  RR: 17 (01 May 2019 17:17) (17 - 20)  SpO2: 98% (01 May 2019 18:20) (95% - 98%)    PHYSICAL EXAM:  GEN:         Awake, responsive and comfortable.  HEENT:    Normal.    RESP:        no wheezing.  CVS:           Regular rate and rhythm.   ABD:         Soft, non-tender, non-distended;     MEDICATIONS  (STANDING):  ALBUTerol/ipratropium for Nebulization 3 milliLiter(s) Nebulizer every 6 hours  atorvastatin 40 milliGRAM(s) Oral at bedtime  azithromycin   Tablet 500 milliGRAM(s) Oral daily  cefTRIAXone   IVPB 1 Gram(s) IV Intermittent every 24 hours  cefTRIAXone   IVPB      dextrose 5%. 1000 milliLiter(s) (50 mL/Hr) IV Continuous <Continuous>  dextrose 50% Injectable 12.5 Gram(s) IV Push once  dextrose 50% Injectable 25 Gram(s) IV Push once  dextrose 50% Injectable 25 Gram(s) IV Push once  enoxaparin Injectable 40 milliGRAM(s) SubCutaneous daily  insulin lispro (HumaLOG) corrective regimen sliding scale   SubCutaneous three times a day before meals  insulin lispro (HumaLOG) corrective regimen sliding scale   SubCutaneous at bedtime  methylPREDNISolone sodium succinate Injectable 40 milliGRAM(s) IV Push three times a day  metoprolol succinate ER 25 milliGRAM(s) Oral daily  pantoprazole    Tablet 40 milliGRAM(s) Oral before breakfast  sertraline 25 milliGRAM(s) Oral daily    MEDICATIONS  (PRN):  ALPRAZolam 0.5 milliGRAM(s) Oral three times a day PRN anxiety  dextrose 40% Gel 15 Gram(s) Oral once PRN Blood Glucose LESS THAN 70 milliGRAM(s)/deciliter  glucagon  Injectable 1 milliGRAM(s) IntraMuscular once PRN Glucose LESS THAN 70 milligrams/deciliter    LABS:                        12.1   4.74  )-----------( 224      ( 01 May 2019 06:28 )             38.9     05-    139  |  100  |  17  ----------------------------<  170<H>  4.2   |  30  |  0.55    Ca    8.8      01 May 2019 06:28    TPro  6.5  /  Alb  2.8<L>  /  TBili  0.3  /  DBili  x   /  AST  17  /  ALT  12  /  AlkPhos  53  05-  < from: TTE Echo Doppler w/o Cont (19 @ 08:33) >   EXAM:  TTE WO CON COMPLETE W DOPPL         PROCEDURE DATE:  2019        INTERPRETATION:  REPORT:    TRANSTHORACIC ECHOCARDIOGRAM REPORT         Patient Name:   COLIN SHELDON Patient Location: [facility] Rec #:  [id number]  Accession #:      [id number]  Account #:                  Height:           67.7 in 172.0 cm  YOB: 1945   Weight:           159.6 lb 72.40 kg  Patient Age:    74 years    BSA:              1.85 m²  Patient Gender: M           BP:               139/80 mmHg       Date of Exam:        2019 8:33:43 AM  Sonographer:         SAI  Referring Physician: MARTHA    Procedure:   2D Echo/Doppler/Color Doppler Complete.  Indications: Non-ST elevation (NSTEMI) myocardial infarction - I21.4  Diagnosis:   Non-ST elevation (NSTEMI) myocardial infarction - I21.4         2D AND M-MODE MEASUREMENTS (normal ranges within parentheses):  Left Ventricle:                  Normal   Aorta/Left Atrium:            Normal  IVSd (2D):              0.82 cm (0.7-1.1) Aortic Root (2D):  3.13 cm   (2.4-3.7)  LVPWd (2D):             1.16 cm (0.7-1.1) Left Atrium (2D):  3.34 cm   (1.9-4.0)  LVIDd (2D):             4.51 cm (3.4-5.7) Right Ventricle:  LVIDs (2D):             3.89 cm           TAPSE:           2.36 cm  LV FS (2D):            13.8 %   (>25%)  Relative Wall Thickness  0.52    (<0.42)    LV DIASTOLIC FUNCTION:  Decel Time: 101 msec    SPECTRAL DOPPLER ANALYSIS (where applicable):  Mitral Valve:  MV P1/2 Time: 29.29 msec  MV Area, PHT: 7.51 cm²    Aortic Valve: AoV Max Edy: 1.79 m/s AoV Peak P.8 mmHg AoV Mean P.6 mmHg    LVOT Vmax: 0.88 m/s LVOT VTI: 0.151 m LVOT Diameter: 2.10 cm    AoV Area, Vmax: 1.70 cm² AoV Area, VTI: 2.79 cm² AoV Area, Vmn: 1.92 cm²    Tricuspid Valve and PA/RV Systolic Pressure: TR Max Velocity: 3.25 m/s RA   Pressure: 5 mmHg RVSP/PASP: 47.2 mmHg       PHYSICIAN INTERPRETATION:  Left Ventricle: The left ventricular internal cavity size is normal. Left   ventricular wall thickness is normal.  Left ventricular ejection fraction, by visual estimation, is 50 to 55%.  Right Ventricle: Normal right ventricular size and function.  Left Atrium: The left atrium is normal in size.  Right Atrium: The right atrium is normal in size.  Pericardium: There is no evidence of pericardial effusion.  Mitral Valve: Thickening of the anterior mitral valve leaflet. Mild   mitral valve regurgitation is seen.  Tricuspid Valve: Structurally normal tricuspid valve, with normal leaflet   excursion. Mild tricuspid regurgitation is visualized. Estimated   pulmonary artery systolic pressure is 47.2 mmHg assuming a right atrial   pressure of 5 mmHg, which is consistent with mild pulmonary hypertension.  Aortic Valve: Peak transaortic gradient equals 12.8 mmHg, mean   transaortic gradient equals 4.6 mmHg, the calculated aortic valve area   equals 2.79 cm² by the continuity equation consistent with normally   opening aortic valve.  Pulmonic Valve: Structurally normal pulmonic valve, with normal leaflet   excursion. Trace pulmonic valve regurgitation.  Aorta: The aortic root is normal in size and structure.       Summary:   1. Left ventricular ejection fraction, by visual estimation, is 50 to   55%.   2. Normal left ventricular internal cavity size.   3. Normal right ventricular size and function.   4. The left atrium is normal in size.   5. The right atrium is normal in size.   6. There is no evidence of pericardial effusion.   7. Mild mitral valve regurgitation.   8. Thickening of the anterior mitral valve leaflet.   9. Structurally normal tricuspid valve, with normal leaflet excursion.  10. Structurally normal pulmonic valve, with normal leaflet excursion.  11. Estimated pulmonary artery systolic pressure is 47.2 mmHg assuming a   right atrial pressure of 5 mmHg, which is consistent with mild pulmonary   hypertension.    [id number] Joel Montgomery MDMD  Electronically signed on 2019 at 9:49:36 PM     JOEL MONTGOMERY M.D.; Attending Cardiologist  This document has been electronically signed. 2019  8:33AM      ASSESSMENT AND PLAN:  ·	SOB.  ·	RLL Pneumonia, CAP.  ·	Acute COPD exacerbation.  ·	Chronic Hypoxic Respiratory failure.  ·	Elevated troponin, seen by Cardiology.  ·	DM.  ·	HLD.  ·	Anxiety and Depression.    Continue O2, nebulizer, steroids and antibiotics.  Echo noted.  DVT prophylaxis.

## 2019-05-02 LAB
ANION GAP SERPL CALC-SCNC: 6 MMOL/L — SIGNIFICANT CHANGE UP (ref 5–17)
BUN SERPL-MCNC: 25 MG/DL — HIGH (ref 7–23)
CALCIUM SERPL-MCNC: 9 MG/DL — SIGNIFICANT CHANGE UP (ref 8.5–10.1)
CHLORIDE SERPL-SCNC: 100 MMOL/L — SIGNIFICANT CHANGE UP (ref 96–108)
CO2 SERPL-SCNC: 31 MMOL/L — SIGNIFICANT CHANGE UP (ref 22–31)
CREAT SERPL-MCNC: 0.66 MG/DL — SIGNIFICANT CHANGE UP (ref 0.5–1.3)
GLUCOSE BLDC GLUCOMTR-MCNC: 150 MG/DL — HIGH (ref 70–99)
GLUCOSE BLDC GLUCOMTR-MCNC: 195 MG/DL — HIGH (ref 70–99)
GLUCOSE BLDC GLUCOMTR-MCNC: 227 MG/DL — HIGH (ref 70–99)
GLUCOSE BLDC GLUCOMTR-MCNC: 96 MG/DL — SIGNIFICANT CHANGE UP (ref 70–99)
GLUCOSE SERPL-MCNC: 174 MG/DL — HIGH (ref 70–99)
HCT VFR BLD CALC: 37.8 % — LOW (ref 39–50)
HGB BLD-MCNC: 11.7 G/DL — LOW (ref 13–17)
MCHC RBC-ENTMCNC: 27.4 PG — SIGNIFICANT CHANGE UP (ref 27–34)
MCHC RBC-ENTMCNC: 31 GM/DL — LOW (ref 32–36)
MCV RBC AUTO: 88.5 FL — SIGNIFICANT CHANGE UP (ref 80–100)
NRBC # BLD: 0 /100 WBCS — SIGNIFICANT CHANGE UP (ref 0–0)
PLATELET # BLD AUTO: 237 K/UL — SIGNIFICANT CHANGE UP (ref 150–400)
POTASSIUM SERPL-MCNC: 4.3 MMOL/L — SIGNIFICANT CHANGE UP (ref 3.5–5.3)
POTASSIUM SERPL-SCNC: 4.3 MMOL/L — SIGNIFICANT CHANGE UP (ref 3.5–5.3)
RBC # BLD: 4.27 M/UL — SIGNIFICANT CHANGE UP (ref 4.2–5.8)
RBC # FLD: 14.4 % — SIGNIFICANT CHANGE UP (ref 10.3–14.5)
SODIUM SERPL-SCNC: 137 MMOL/L — SIGNIFICANT CHANGE UP (ref 135–145)
WBC # BLD: 9.63 K/UL — SIGNIFICANT CHANGE UP (ref 3.8–10.5)
WBC # FLD AUTO: 9.63 K/UL — SIGNIFICANT CHANGE UP (ref 3.8–10.5)

## 2019-05-02 RX ORDER — BUDESONIDE AND FORMOTEROL FUMARATE DIHYDRATE 160; 4.5 UG/1; UG/1
2 AEROSOL RESPIRATORY (INHALATION)
Qty: 0 | Refills: 0 | Status: DISCONTINUED | OUTPATIENT
Start: 2019-05-02 | End: 2019-05-06

## 2019-05-02 RX ORDER — FLUTICASONE PROPIONATE AND SALMETEROL 50; 250 UG/1; UG/1
1 POWDER ORAL; RESPIRATORY (INHALATION)
Qty: 0 | Refills: 0 | COMMUNITY

## 2019-05-02 RX ADMIN — ENOXAPARIN SODIUM 40 MILLIGRAM(S): 100 INJECTION SUBCUTANEOUS at 12:02

## 2019-05-02 RX ADMIN — CEFTRIAXONE 100 GRAM(S): 500 INJECTION, POWDER, FOR SOLUTION INTRAMUSCULAR; INTRAVENOUS at 09:41

## 2019-05-02 RX ADMIN — Medication 25 MILLIGRAM(S): at 05:33

## 2019-05-02 RX ADMIN — Medication 1 SPRAY(S): at 06:10

## 2019-05-02 RX ADMIN — Medication 40 MILLIGRAM(S): at 21:32

## 2019-05-02 RX ADMIN — Medication 0.5 MILLIGRAM(S): at 21:32

## 2019-05-02 RX ADMIN — Medication 3 MILLILITER(S): at 00:29

## 2019-05-02 RX ADMIN — Medication 3 MILLILITER(S): at 18:12

## 2019-05-02 RX ADMIN — Medication 3 MILLILITER(S): at 11:30

## 2019-05-02 RX ADMIN — AZITHROMYCIN 500 MILLIGRAM(S): 500 TABLET, FILM COATED ORAL at 12:03

## 2019-05-02 RX ADMIN — Medication 4: at 12:01

## 2019-05-02 RX ADMIN — Medication 3 MILLILITER(S): at 06:33

## 2019-05-02 RX ADMIN — Medication 0.5 MILLIGRAM(S): at 01:28

## 2019-05-02 RX ADMIN — PANTOPRAZOLE SODIUM 40 MILLIGRAM(S): 20 TABLET, DELAYED RELEASE ORAL at 06:09

## 2019-05-02 RX ADMIN — SERTRALINE 25 MILLIGRAM(S): 25 TABLET, FILM COATED ORAL at 12:02

## 2019-05-02 RX ADMIN — ATORVASTATIN CALCIUM 40 MILLIGRAM(S): 80 TABLET, FILM COATED ORAL at 21:32

## 2019-05-02 RX ADMIN — Medication 40 MILLIGRAM(S): at 05:33

## 2019-05-02 RX ADMIN — Medication 40 MILLIGRAM(S): at 16:12

## 2019-05-02 NOTE — PROGRESS NOTE ADULT - SUBJECTIVE AND OBJECTIVE BOX
INTERVAL HPI/OVERNIGHT EVENTS:        REVIEW OF SYSTEMS:  CONSTITUTIONAL:  c/o  burning  CP during  night  now  better    NECK: No pain or stiffnes  RESPIRATORY: No SOB   CARDIOVASCULAR: No chest pain, palpitations, dizziness,   GASTROINTESTINAL: No abdominal pain. No nausea, vomiting,   NEUROLOGICAL: No headaches, no  blurry  vision no  dizziness  SKIN: No itching,   MUSCULOSKELETAL: No pain    MEDICATION:  ALBUTerol/ipratropium for Nebulization 3 milliLiter(s) Nebulizer every 6 hours  ALPRAZolam 0.5 milliGRAM(s) Oral three times a day PRN  atorvastatin 40 milliGRAM(s) Oral at bedtime  azithromycin   Tablet 500 milliGRAM(s) Oral daily  cefTRIAXone   IVPB 1 Gram(s) IV Intermittent every 24 hours  cefTRIAXone   IVPB      dextrose 40% Gel 15 Gram(s) Oral once PRN  dextrose 5%. 1000 milliLiter(s) IV Continuous <Continuous>  dextrose 50% Injectable 12.5 Gram(s) IV Push once  dextrose 50% Injectable 25 Gram(s) IV Push once  dextrose 50% Injectable 25 Gram(s) IV Push once  enoxaparin Injectable 40 milliGRAM(s) SubCutaneous daily  glucagon  Injectable 1 milliGRAM(s) IntraMuscular once PRN  insulin lispro (HumaLOG) corrective regimen sliding scale   SubCutaneous three times a day before meals  insulin lispro (HumaLOG) corrective regimen sliding scale   SubCutaneous at bedtime  methylPREDNISolone sodium succinate Injectable 40 milliGRAM(s) IV Push three times a day  metoprolol succinate ER 25 milliGRAM(s) Oral daily  pantoprazole    Tablet 40 milliGRAM(s) Oral before breakfast  sertraline 25 milliGRAM(s) Oral daily  sodium chloride 0.65% Nasal 1 Spray(s) Both Nostrils every 6 hours PRN    Vital Signs Last 24 Hrs  T(C): 36 (02 May 2019 06:11), Max: 36.7 (01 May 2019 17:17)  T(F): 96.8 (02 May 2019 06:11), Max: 98.1 (01 May 2019 17:17)  HR: 94 (02 May 2019 06:34) (91 - 114)  BP: 120/80 (02 May 2019 06:11) (120/80 - 161/59)  BP(mean): --  RR: 18 (02 May 2019 06:11) (17 - 20)  SpO2: 97% (02 May 2019 06:34) (95% - 98%)    PHYSICAL EXAM:  GENERAL: NAD, well-groomed, well-developed  EYES:  conjunctiva and sclera clear  ENMT:  Moist mucous membranes,   NECK: Supple, No JVD, Normal thyroid  NERVOUS SYSTEM:  Alert oriented   no  focal  deficits;   CHEST/LUNG: Clear    HEART: Regular rate and rhythm; No murmurs, rubs, or gallops  ABDOMEN: Soft, Nontender, Nondistended; Bowel sounds present  EXTREMITIES:  no  edema no  tenderness  SKIN: No rashes   LABS:                        11.7   9.63  )-----------( 237      ( 02 May 2019 06:06 )             37.8     05-02    137  |  100  |  25<H>  ----------------------------<  174<H>  4.3   |  31  |  0.66    Ca    9.0      02 May 2019 06:06    TPro  6.5  /  Alb  2.8<L>  /  TBili  0.3  /  DBili  x   /  AST  17  /  ALT  12  /  AlkPhos  53  05-01        CAPILLARY BLOOD GLUCOSE      POCT Blood Glucose.: 150 mg/dL (02 May 2019 07:15)  POCT Blood Glucose.: 166 mg/dL (01 May 2019 22:25)  POCT Blood Glucose.: 178 mg/dL (01 May 2019 15:55)  POCT Blood Glucose.: 167 mg/dL (01 May 2019 12:56)  POCT Blood Glucose.: 181 mg/dL (01 May 2019 11:12)      RADIOLOGY & ADDITIONAL TESTS:    Imaging reports  Personally Reviewed:  [x ] YES  [ ] NO    Consultant(s) Notes Reviewed:  [x ] YES  [ ] NO    Care Discussed with Consultants/Other Providers [x ] YES  [ ] NO  Problem/Plan - 1:  ·  Problem: COPD with exacerbation.  Plan: bronchodilators  steroids  AB  dvt  prophylaxis  pulmonary  consult.     Problem/Plan - 2:  ·  Problem: Pneumonia.  Plan: as  above.     Problem/Plan - 3:  ·  Problem: Troponin level elevated.  Plan: probable spillage  ST  as  per  Cardiology    Problem/Plan - 4:  ·  Problem: Chest pain.  Plan: tylenol  xanax.     Problem/Plan - 5:  ·  Problem: Anxiety.  Plan: xanax  zoloft.     Problem/Plan - 6:  Problem: Diabetes. Plan: insulin  coverage.

## 2019-05-02 NOTE — CONSULT NOTE ADULT - SUBJECTIVE AND OBJECTIVE BOX
Patient is a 74y old  Male who presents with a chief complaint of chest  pain  copd  excerrbation (02 May 2019 14:06)    HPI: patient  c/o  cough  chest pain  ER  found  to  have  elevated  troponin  admitted  for  further  w/u  and  treatment  discussed with  ER  MD 7:40 pm , he   had  discussed with cardiology patient  was going  to  have  ct/angio   of chest patient  to  be  admitted  yo  my  service  by  hospitalist.,  was called 9;54 pm by  ER  RN for  admission orders again  advised  that  patient  was  to have been  admitted to my  service  by  Hospitalist   as  per  protocol. patient  admitted  by  NP  Walker to  telemetry  presently   anxious  no  CP  mild  SOB (30 Apr 2019 08:16)      REVIEW OF SYSTEMS as above  Constitutional - No fever, No weight loss, No fatigue  HEENT - No neck pain  Respiratory - No cough, No shortness of breath  Cardiovascular - No chest pain, No palpitations  Gastrointestinal - No abdominal pain, No nausea, No vomiting  Genitourinary - No dysuria, No frequency  Neurological - No headaches, No loss of strength, No numbness, No tremors  Skin - No lesions   Endocrine - No temperature intolerance  Musculoskeletal - No joint pain  Psychiatric - No depression, No anxiety    PAST MEDICAL & SURGICAL HISTORY  Hyperglycemia A1c 6.3  COPD with exacerbation  Hypercholesteremia  Pneumonia  Anxiety and depression  COPD (chronic obstructive pulmonary disease)  No significant past surgical history    SOCHX: Lives with wife in house - 6 steps to enter,   Ind amb with rollator, Ind ADLs  Hx tobacco use X50 yrs, stopped 10 years ago,  -  alcohol    FMHX: FA/MO  Noncontributory     ALLERGIES  No Known Allergies    MEDICATIONS reviewed  MEDICATIONS  (STANDING):  ALBUTerol/ipratropium for Nebulization 3 milliLiter(s) Nebulizer every 6 hours  atorvastatin 40 milliGRAM(s) Oral at bedtime  azithromycin   Tablet 500 milliGRAM(s) Oral daily  cefTRIAXone   IVPB 1 Gram(s) IV Intermittent every 24 hours  cefTRIAXone   IVPB      dextrose 5%. 1000 milliLiter(s) (50 mL/Hr) IV Continuous <Continuous>  dextrose 50% Injectable 12.5 Gram(s) IV Push once  dextrose 50% Injectable 25 Gram(s) IV Push once  dextrose 50% Injectable 25 Gram(s) IV Push once  enoxaparin Injectable 40 milliGRAM(s) SubCutaneous daily  insulin lispro (HumaLOG) corrective regimen sliding scale   SubCutaneous three times a day before meals  insulin lispro (HumaLOG) corrective regimen sliding scale   SubCutaneous at bedtime  methylPREDNISolone sodium succinate Injectable 40 milliGRAM(s) IV Push three times a day  metoprolol succinate ER 25 milliGRAM(s) Oral daily  pantoprazole    Tablet 40 milliGRAM(s) Oral before breakfast  sertraline 25 milliGRAM(s) Oral daily    MEDICATIONS  (PRN):  ALPRAZolam 0.5 milliGRAM(s) Oral three times a day PRN anxiety  dextrose 40% Gel 15 Gram(s) Oral once PRN Blood Glucose LESS THAN 70 milliGRAM(s)/deciliter  glucagon  Injectable 1 milliGRAM(s) IntraMuscular once PRN Glucose LESS THAN 70 milligrams/deciliter  sodium chloride 0.65% Nasal 1 Spray(s) Both Nostrils every 6 hours PRN Nasal Congestion      VITALS  T(C): 36.8 (05-02-19 @ 16:25), Max: 36.8 (05-02-19 @ 16:25)  HR: 109 (05-02-19 @ 16:25) (91 - 111)  BP: 134/77 (05-02-19 @ 16:25) (120/80 - 161/59)  RR: 22 (05-02-19 @ 16:25) (17 - 22)  SpO2: 97% (05-02-19 @ 16:25) (95% - 98%)  Wt(kg): -- 72.4 kg    PHYSICAL EXAM  BMI - 24.3  Constitutional - NAD, Comfortable in bed  HEENT - NCAT, EOMI  Neck - Supple, functional ROM  Cardiovascular - RRR  Abdomen - Soft, Not tender  Extremities - Functional range of motion   Neurologic -                    Cognitive - Awake, Alert, Oriented     Speech Intact     Language  Intact     Motor - No focal deficits     Sensory - Intact to LT     Reflexes - DTR Intact     Psychiatric - Mood       RECENT LABS reviewed  CBC Full  -  ( 02 May 2019 06:06 )  WBC Count : 9.63 K/uL  RBC Count : 4.27 M/uL  Hemoglobin : 11.7 g/dL  Hematocrit : 37.8 %  Platelet Count - Automated : 237 K/uL  Mean Cell Volume : 88.5 fl  Mean Cell Hemoglobin : 27.4 pg  Mean Cell Hemoglobin Concentration : 31.0 gm/dL  05-02    137  |  100  |  25<H>  ----------------------------<  174<H>  4.3   |  31  |  0.66    Ca    9.0      02 May 2019 06:06    TPro  6.5  /  Alb  2.8<L>  /  TBili  0.3  /  DBili  x   /  AST  17  /  ALT  12  /  AlkPhos  53  05-01        IMAGING reviewed:  CXR - R basilar infiltrate  CTA - R CA pneumonia, PHTN, unchanged RUL nodule, pHTN, severe atheroslerotic changes of the aorta  Dr. Forde - R pneumonia, COPD exac., chronic hypoxic resp failure  Dr. Hernandez - essentially normal TTE, CPK & ECG unchanged    FUNCTIONAL HISTORY    CURRENT FUNCTIONAL STATUS amb 20 feet with RW with therapist    IMPRESSION: 61 M Hx Tob, COPD, anxiety, depression, with multiple hospitalizations since 2013 usually for COPD exac, pneumonia - again has pneumonia, COPD exac, stable cardiac W/U.     PLAN: Will follow - recommendations will depend upon clinical and functional course. Patient is a 74y old  Male who presents with a chief complaint of chest  pain  copd  excerrbation (02 May 2019 14:06)    HPI: patient  c/o  cough  chest pain  ER  found  to  have  elevated  troponin  admitted  for  further  w/u  and  treatment  discussed with  ER  MD 7:40 pm , he   had  discussed with cardiology patient  was going  to  have  ct/angio   of chest patient  to  be  admitted  to  my  service  by  hospitalist.,  was called 9;54 pm by  ER  RN for  admission orders again  advised  that  patient  was  to have been  admitted to my  service  by  Hospitalist   as  per  protocol. patient  admitted  by  NP  Walker to  telemetry  presently   anxious  no  CP  mild  SOB (30 Apr 2019 08:16)      REVIEW OF SYSTEMS as above  Constitutional - See HPI  HEENT - No neck pain  Respiratory - See HPI  Cardiovascular - No chest pain, No palpitations  Gastrointestinal - No abdominal pain, No nausea, No vomiting  Genitourinary - No dysuria, No frequency  Neurological - No headaches, No loss of strength, No numbness, No tremors  Skin - No lesions   Endocrine - No temperature intolerance  Musculoskeletal - No joint pain  Psychiatric - No depression, No anxiety    PAST MEDICAL & SURGICAL HISTORY  Hyperglycemia A1c 6.3  COPD with exacerbation  Hypercholesteremia  Pneumonia  Anxiety and depression  COPD (chronic obstructive pulmonary disease  Traumatic amputation L index dist phal.  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 1000 milliLiter(s) (50 mL/Hr) IV Continuous <Continuous>  dextrose 50% Injectable 12.5 Gram(s) IV Push once  dextrose 50% Injectable 25 Gram(s) IV Push once  dextrose 50% Injectable 25 Gram(s) IV Push once  enoxaparin Injectable 40 milliGRAM(s) SubCutaneous daily  insulin lispro (HumaLOG) corrective regimen sliding scale   SubCutaneous three times a day before meals  insulin lispro (HumaLOG) corrective regimen sliding scale   SubCutaneous at bedtime  methylPREDNISolone sodium succinate Injectable 40 milliGRAM(s) IV Push three times a day  metoprolol succinate ER 25 milliGRAM(s) Oral daily  pantoprazole    Tablet 40 milliGRAM(s) Oral before breakfast  sertraline 25 milliGRAM(s) Oral daily    MEDICATIONS  (PRN):  ALPRAZolam 0.5 milliGRAM(s) Oral three times a day PRN anxiety  dextrose 40% Gel 15 Gram(s) Oral once PRN Blood Glucose LESS THAN 70 milliGRAM(s)/deciliter  glucagon  Injectable 1 milliGRAM(s) IntraMuscular once PRN Glucose LESS THAN 70 milligrams/deciliter  sodium chloride 0.65% Nasal 1 Spray(s) Both Nostrils every 6 hours PRN Nasal Congestion      VITALS  T(C): 36.8 (05-02-19 @ 16:25), Max: 36.8 (05-02-19 @ 16:25)  HR: 109 (05-02-19 @ 16:25) (91 - 111)  BP: 134/77 (05-02-19 @ 16:25) (120/80 - 161/59)  RR: 22 (05-02-19 @ 16:25) (17 - 22)  SpO2: 97% (05-02-19 @ 16:25) (95% - 98%)  Wt(kg): -- 72.4 kg    PHYSICAL EXAM  BMI - 24.3  Constitutional - NAD, Comfortable in bed with O2 NC  HEENT - NCAT, EOMI  Neck - Supple, functional ROM  Cardiovascular - RRR  Abdomen - Soft, Not tender  Extremities - Functional range of motion   Neurologic -                    Cognitive - Awake, Alert, Oriented X3     Speech Intact     Language  Intact     Motor - No focal deficits.  L hand intrinsic atrophy     Sensory - Intact to LT     Reflexes - DTR Intact     Psychiatric - Mood good      RECENT LABS reviewed  CBC Full  -  ( 02 May 2019 06:06 )  WBC Count : 9.63 K/uL  RBC Count : 4.27 M/uL  Hemoglobin : 11.7 g/dL  Hematocrit : 37.8 %  Platelet Count - Automated : 237 K/uL  Mean Cell Volume : 88.5 fl  Mean Cell Hemoglobin : 27.4 pg  Mean Cell Hemoglobin Concentration : 31.0 gm/dL  05-02    137  |  100  |  25<H>  ----------------------------<  174<H>  4.3   |  31  |  0.66    Ca    9.0      02 May 2019 06:06    TPro  6.5  /  Alb  2.8<L>  /  TBili  0.3  /  DBili  x   /  AST  17  /  ALT  12  /  AlkPhos  53  05-01      IMAGING reviewed:  CXR - R basilar infiltrate  CTA - R CA pneumonia, PHTN, unchanged RUL nodule, pHTN, atherosclerotic changes of the aorta  Dr. Forde - R pneumonia, COPD exac., chronic hypoxic resp failure  Dr. Hernandez - essentially normal TTE, CPK & ECG unchanged    FUNCTIONAL HISTORY Ind amb, ADLs with O2    CURRENT FUNCTIONAL STATUS amb 20 feet with RW with therapist    IMPRESSION: 61 M Hx Tob, COPD, anxiety, depression, with multiple hospitalizations since 2013 usually for COPD exac, pneumonia - again has pneumonia, COPD exac, stable cardiac W/U.     PLAN: Will follow - recommendations will depend upon clinical and functional course.

## 2019-05-02 NOTE — PROGRESS NOTE ADULT - SUBJECTIVE AND OBJECTIVE BOX
INTERVAL HPI:  74 year male with HLD, DM, COPD on home O2, Anxiety and depression. Came with 2 days of sob and chest pain with cough at times. Also reports chills but denies fever, thinks may have started as cold. Admitted with RLL pneumonia and elevated troponin.  Smoked close to 50 years, quit 10 years ago.    OVERNIGHT EVENTS:  Comfortable.    Vital Signs Last 24 Hrs  T(C): 36.3 (02 May 2019 12:10), Max: 36.7 (01 May 2019 17:17)  T(F): 97.3 (02 May 2019 12:10), Max: 98.1 (01 May 2019 17:17)  HR: 93 (02 May 2019 12:10) (91 - 105)  BP: 131/83 (02 May 2019 12:10) (120/80 - 161/59)  BP(mean): --  RR: 18 (02 May 2019 12:10) (17 - 18)  SpO2: 97% (02 May 2019 12:10) (95% - 98%)    PHYSICAL EXAM:  GEN:         Awake, responsive and comfortable.  HEENT:    Normal.    RESP:        no wheezing.  CVS:           Regular rate and rhythm.   ABD:         Soft, non-tender, non-distended;     MEDICATIONS  (STANDING):  ALBUTerol/ipratropium for Nebulization 3 milliLiter(s) Nebulizer every 6 hours  atorvastatin 40 milliGRAM(s) Oral at bedtime  azithromycin   Tablet 500 milliGRAM(s) Oral daily  cefTRIAXone   IVPB 1 Gram(s) IV Intermittent every 24 hours  cefTRIAXone   IVPB      dextrose 5%. 1000 milliLiter(s) (50 mL/Hr) IV Continuous <Continuous>  dextrose 50% Injectable 12.5 Gram(s) IV Push once  dextrose 50% Injectable 25 Gram(s) IV Push once  dextrose 50% Injectable 25 Gram(s) IV Push once  enoxaparin Injectable 40 milliGRAM(s) SubCutaneous daily  insulin lispro (HumaLOG) corrective regimen sliding scale   SubCutaneous three times a day before meals  insulin lispro (HumaLOG) corrective regimen sliding scale   SubCutaneous at bedtime  methylPREDNISolone sodium succinate Injectable 40 milliGRAM(s) IV Push three times a day  metoprolol succinate ER 25 milliGRAM(s) Oral daily  pantoprazole    Tablet 40 milliGRAM(s) Oral before breakfast  sertraline 25 milliGRAM(s) Oral daily    MEDICATIONS  (PRN):  ALPRAZolam 0.5 milliGRAM(s) Oral three times a day PRN anxiety  dextrose 40% Gel 15 Gram(s) Oral once PRN Blood Glucose LESS THAN 70 milliGRAM(s)/deciliter  glucagon  Injectable 1 milliGRAM(s) IntraMuscular once PRN Glucose LESS THAN 70 milligrams/deciliter  sodium chloride 0.65% Nasal 1 Spray(s) Both Nostrils every 6 hours PRN Nasal Congestion    LABS:                        11.7   9.63  )-----------( 237      ( 02 May 2019 06:06 )             37.8     05-02    137  |  100  |  25<H>  ----------------------------<  174<H>  4.3   |  31  |  0.66    Ca    9.0      02 May 2019 06:06    TPro  6.5  /  Alb  2.8<L>  /  TBili  0.3  /  DBili  x   /  AST  17  /  ALT  12  /  AlkPhos  53  05-01    ASSESSMENT AND PLAN:  ·	SOB.  ·	RLL Pneumonia, CAP.  ·	Acute COPD exacerbation.  ·	Chronic Hypoxic Respiratory failure.  ·	Elevated troponin, seen by Cardiology.  ·	DM.  ·	HLD.  ·	Anxiety and Depression.    Continue O2, nebulizer, steroids and antibiotics.  Echo noted.  DVT prophylaxis.

## 2019-05-03 LAB
GLUCOSE BLDC GLUCOMTR-MCNC: 168 MG/DL — HIGH (ref 70–99)
GLUCOSE BLDC GLUCOMTR-MCNC: 174 MG/DL — HIGH (ref 70–99)
GLUCOSE BLDC GLUCOMTR-MCNC: 187 MG/DL — HIGH (ref 70–99)
GLUCOSE BLDC GLUCOMTR-MCNC: 260 MG/DL — HIGH (ref 70–99)

## 2019-05-03 RX ADMIN — Medication 3 MILLILITER(S): at 11:22

## 2019-05-03 RX ADMIN — SERTRALINE 25 MILLIGRAM(S): 25 TABLET, FILM COATED ORAL at 11:04

## 2019-05-03 RX ADMIN — ATORVASTATIN CALCIUM 40 MILLIGRAM(S): 80 TABLET, FILM COATED ORAL at 21:42

## 2019-05-03 RX ADMIN — BUDESONIDE AND FORMOTEROL FUMARATE DIHYDRATE 2 PUFF(S): 160; 4.5 AEROSOL RESPIRATORY (INHALATION) at 17:23

## 2019-05-03 RX ADMIN — Medication 25 MILLIGRAM(S): at 05:26

## 2019-05-03 RX ADMIN — CEFTRIAXONE 100 GRAM(S): 500 INJECTION, POWDER, FOR SOLUTION INTRAMUSCULAR; INTRAVENOUS at 07:41

## 2019-05-03 RX ADMIN — Medication 40 MILLIGRAM(S): at 21:43

## 2019-05-03 RX ADMIN — Medication 6: at 11:05

## 2019-05-03 RX ADMIN — Medication 40 MILLIGRAM(S): at 11:06

## 2019-05-03 RX ADMIN — PANTOPRAZOLE SODIUM 40 MILLIGRAM(S): 20 TABLET, DELAYED RELEASE ORAL at 06:51

## 2019-05-03 RX ADMIN — Medication 40 MILLIGRAM(S): at 05:26

## 2019-05-03 RX ADMIN — BUDESONIDE AND FORMOTEROL FUMARATE DIHYDRATE 2 PUFF(S): 160; 4.5 AEROSOL RESPIRATORY (INHALATION) at 05:29

## 2019-05-03 RX ADMIN — Medication 2: at 17:22

## 2019-05-03 RX ADMIN — Medication 0.5 MILLIGRAM(S): at 07:57

## 2019-05-03 RX ADMIN — Medication 3 MILLILITER(S): at 05:45

## 2019-05-03 RX ADMIN — AZITHROMYCIN 500 MILLIGRAM(S): 500 TABLET, FILM COATED ORAL at 11:04

## 2019-05-03 RX ADMIN — Medication 0.5 MILLIGRAM(S): at 21:45

## 2019-05-03 RX ADMIN — Medication 2: at 07:40

## 2019-05-03 RX ADMIN — Medication 3 MILLILITER(S): at 17:00

## 2019-05-03 RX ADMIN — ENOXAPARIN SODIUM 40 MILLIGRAM(S): 100 INJECTION SUBCUTANEOUS at 11:06

## 2019-05-03 RX ADMIN — Medication 3 MILLILITER(S): at 00:02

## 2019-05-03 RX ADMIN — Medication 3 MILLILITER(S): at 23:27

## 2019-05-03 NOTE — PROGRESS NOTE ADULT - SUBJECTIVE AND OBJECTIVE BOX
INTERVAL HPI:  74 year male with HLD, DM, COPD on home O2, Anxiety and depression. Came with 2 days of sob and chest pain with cough at times. Also reports chills but denies fever, thinks may have started as cold. Admitted with RLL pneumonia and elevated troponin.  Smoked close to 50 years, quit 10 years ago.    OVERNIGHT EVENTS:  Awake and comfortable on nasal O2.    Vital Signs Last 24 Hrs  T(C): 36.6 (03 May 2019 16:30), Max: 36.6 (03 May 2019 16:30)  T(F): 97.9 (03 May 2019 16:30), Max: 97.9 (03 May 2019 16:30)  HR: 88 (03 May 2019 17:25) (76 - 104)  BP: 154/89 (03 May 2019 16:30) (125/78 - 154/89)  BP(mean): --  RR: 19 (03 May 2019 16:30) (18 - 19)  SpO2: 98% (03 May 2019 17:25) (95% - 98%)    PHYSICAL EXAM:  GEN:         Awake, responsive and comfortable.  HEENT:    Normal.    RESP:       no wheezing.  CVS:         Regular rate and rhythm.   ABD:         Soft, non-tender, non-distended;     MEDICATIONS  (STANDING):  ALBUTerol/ipratropium for Nebulization 3 milliLiter(s) Nebulizer every 6 hours  atorvastatin 40 milliGRAM(s) Oral at bedtime  azithromycin   Tablet 500 milliGRAM(s) Oral daily  buDESOnide 160 MICROgram(s)/formoterol 4.5 MICROgram(s) Inhaler 2 Puff(s) Inhalation two times a day  cefTRIAXone   IVPB      cefTRIAXone   IVPB 1 Gram(s) IV Intermittent every 24 hours  dextrose 5%. 1000 milliLiter(s) (50 mL/Hr) IV Continuous <Continuous>  dextrose 50% Injectable 12.5 Gram(s) IV Push once  dextrose 50% Injectable 25 Gram(s) IV Push once  dextrose 50% Injectable 25 Gram(s) IV Push once  enoxaparin Injectable 40 milliGRAM(s) SubCutaneous daily  insulin lispro (HumaLOG) corrective regimen sliding scale   SubCutaneous three times a day before meals  insulin lispro (HumaLOG) corrective regimen sliding scale   SubCutaneous at bedtime  methylPREDNISolone sodium succinate Injectable 40 milliGRAM(s) IV Push three times a day  metoprolol succinate ER 25 milliGRAM(s) Oral daily  pantoprazole    Tablet 40 milliGRAM(s) Oral before breakfast  sertraline 25 milliGRAM(s) Oral daily    MEDICATIONS  (PRN):  ALPRAZolam 0.5 milliGRAM(s) Oral three times a day PRN anxiety  dextrose 40% Gel 15 Gram(s) Oral once PRN Blood Glucose LESS THAN 70 milliGRAM(s)/deciliter  glucagon  Injectable 1 milliGRAM(s) IntraMuscular once PRN Glucose LESS THAN 70 milligrams/deciliter  sodium chloride 0.65% Nasal 1 Spray(s) Both Nostrils every 6 hours PRN Nasal Congestion    LABS:                        11.7   9.63  )-----------( 237      ( 02 May 2019 06:06 )             37.8     05-02    137  |  100  |  25<H>  ----------------------------<  174<H>  4.3   |  31  |  0.66    Ca    9.0      02 May 2019 06:06    ASSESSMENT AND PLAN:  ·	SOB.  ·	RLL Pneumonia, CAP.  ·	Acute COPD exacerbation.  ·	Chronic Hypoxic Respiratory failure.  ·	Elevated troponin, seen by Cardiology.  ·	DM.  ·	HLD.  ·	Anxiety and Depression.    DC Bipap as has not used it according to staff.  Reduce steroids.  Continue antibiotic, Symbicort and nebulizer. INTERVAL HPI:  74 year male with HLD, DM, COPD on home O2, Anxiety and depression. Came with 2 days of sob and chest pain with cough at times. Also reports chills but denies fever, thinks may have started as cold. Admitted with RLL pneumonia and elevated troponin.  Smoked close to 50 years, quit 10 years ago.    OVERNIGHT EVENTS:  Awake and comfortable on nasal O2.    Vital Signs Last 24 Hrs  T(C): 36.6 (03 May 2019 16:30), Max: 36.6 (03 May 2019 16:30)  T(F): 97.9 (03 May 2019 16:30), Max: 97.9 (03 May 2019 16:30)  HR: 88 (03 May 2019 17:25) (76 - 104)  BP: 154/89 (03 May 2019 16:30) (125/78 - 154/89)  BP(mean): --  RR: 19 (03 May 2019 16:30) (18 - 19)  SpO2: 98% (03 May 2019 17:25) (95% - 98%)    PHYSICAL EXAM:  GEN:         Awake, responsive and comfortable.  HEENT:    Normal.    RESP:       no wheezing.  CVS:         Regular rate and rhythm.   ABD:         Soft, non-tender, non-distended;     MEDICATIONS  (STANDING):  ALBUTerol/ipratropium for Nebulization 3 milliLiter(s) Nebulizer every 6 hours  atorvastatin 40 milliGRAM(s) Oral at bedtime  azithromycin   Tablet 500 milliGRAM(s) Oral daily  buDESOnide 160 MICROgram(s)/formoterol 4.5 MICROgram(s) Inhaler 2 Puff(s) Inhalation two times a day  cefTRIAXone   IVPB      cefTRIAXone   IVPB 1 Gram(s) IV Intermittent every 24 hours  dextrose 5%. 1000 milliLiter(s) (50 mL/Hr) IV Continuous <Continuous>  dextrose 50% Injectable 12.5 Gram(s) IV Push once  dextrose 50% Injectable 25 Gram(s) IV Push once  dextrose 50% Injectable 25 Gram(s) IV Push once  enoxaparin Injectable 40 milliGRAM(s) SubCutaneous daily  insulin lispro (HumaLOG) corrective regimen sliding scale   SubCutaneous three times a day before meals  insulin lispro (HumaLOG) corrective regimen sliding scale   SubCutaneous at bedtime  methylPREDNISolone sodium succinate Injectable 40 milliGRAM(s) IV Push three times a day  metoprolol succinate ER 25 milliGRAM(s) Oral daily  pantoprazole    Tablet 40 milliGRAM(s) Oral before breakfast  sertraline 25 milliGRAM(s) Oral daily    MEDICATIONS  (PRN):  ALPRAZolam 0.5 milliGRAM(s) Oral three times a day PRN anxiety  dextrose 40% Gel 15 Gram(s) Oral once PRN Blood Glucose LESS THAN 70 milliGRAM(s)/deciliter  glucagon  Injectable 1 milliGRAM(s) IntraMuscular once PRN Glucose LESS THAN 70 milligrams/deciliter  sodium chloride 0.65% Nasal 1 Spray(s) Both Nostrils every 6 hours PRN Nasal Congestion    LABS:                        11.7   9.63  )-----------( 237      ( 02 May 2019 06:06 )             37.8     05-02    137  |  100  |  25<H>  ----------------------------<  174<H>  4.3   |  31  |  0.66    Ca    9.0      02 May 2019 06:06    ASSESSMENT AND PLAN:  ·	SOB.  ·	RLL Pneumonia, CAP.  ·	Acute COPD exacerbation.  ·	Chronic Hypoxic Respiratory failure.  ·	Elevated troponin, seen by Cardiology.  ·	DM.  ·	HLD.  ·	Anxiety and Depression.    Reduce steroids.  Continue antibiotic, Symbicort and nebulizer.

## 2019-05-03 NOTE — PROGRESS NOTE ADULT - SUBJECTIVE AND OBJECTIVE BOX
INTERVAL HPI/OVERNIGHT EVENTS:        REVIEW OF SYSTEMS:  CONSTITUTIONAL:  C/O  insomnia  POST  when  walking    NECK: No pain or stiffnes  RESPIRATORY: No SOB   CARDIOVASCULAR: No chest pain, palpitations, dizziness,   GASTROINTESTINAL: No abdominal pain. No nausea, vomiting,   NEUROLOGICAL: No headaches, no  blurry  vision no  dizziness  SKIN: No itching,   MUSCULOSKELETAL: No pain    MEDICATION:  ALBUTerol/ipratropium for Nebulization 3 milliLiter(s) Nebulizer every 6 hours  ALPRAZolam 0.5 milliGRAM(s) Oral three times a day PRN  atorvastatin 40 milliGRAM(s) Oral at bedtime  azithromycin   Tablet 500 milliGRAM(s) Oral daily  buDESOnide 160 MICROgram(s)/formoterol 4.5 MICROgram(s) Inhaler 2 Puff(s) Inhalation two times a day  cefTRIAXone   IVPB      cefTRIAXone   IVPB 1 Gram(s) IV Intermittent every 24 hours  dextrose 40% Gel 15 Gram(s) Oral once PRN  dextrose 5%. 1000 milliLiter(s) IV Continuous <Continuous>  dextrose 50% Injectable 12.5 Gram(s) IV Push once  dextrose 50% Injectable 25 Gram(s) IV Push once  dextrose 50% Injectable 25 Gram(s) IV Push once  enoxaparin Injectable 40 milliGRAM(s) SubCutaneous daily  glucagon  Injectable 1 milliGRAM(s) IntraMuscular once PRN  insulin lispro (HumaLOG) corrective regimen sliding scale   SubCutaneous three times a day before meals  insulin lispro (HumaLOG) corrective regimen sliding scale   SubCutaneous at bedtime  methylPREDNISolone sodium succinate Injectable 40 milliGRAM(s) IV Push three times a day  metoprolol succinate ER 25 milliGRAM(s) Oral daily  pantoprazole    Tablet 40 milliGRAM(s) Oral before breakfast  sertraline 25 milliGRAM(s) Oral daily  sodium chloride 0.65% Nasal 1 Spray(s) Both Nostrils every 6 hours PRN    Vital Signs Last 24 Hrs  T(C): 36 (03 May 2019 05:24), Max: 36.8 (02 May 2019 16:25)  T(F): 96.8 (03 May 2019 05:24), Max: 98.3 (02 May 2019 16:25)  HR: 76 (03 May 2019 07:00) (76 - 111)  BP: 125/78 (03 May 2019 05:24) (125/78 - 136/89)  BP(mean): --  RR: 18 (03 May 2019 05:24) (18 - 22)  SpO2: 95% (03 May 2019 05:24) (95% - 97%)    PHYSICAL EXAM:  GENERAL: NAD, well-groomed, well-developed  EYES:  conjunctiva and sclera clear  ENMT:  Moist mucous membranes,   NECK: Supple, No JVD, Normal thyroid  NERVOUS SYSTEM:  Alert oriented   no  focal  deficits;   CHEST/LUNG: decreased  bs    HEART: Regular rate and rhythm; No murmurs, rubs, or gallops  ABDOMEN: Soft, Nontender, Nondistended; Bowel sounds present  EXTREMITIES:  no  edema no  tenderness  SKIN: No rashes   LABS:                        11.7   9.63  )-----------( 237      ( 02 May 2019 06:06 )             37.8     05-02    137  |  100  |  25<H>  ----------------------------<  174<H>  4.3   |  31  |  0.66    Ca    9.0      02 May 2019 06:06          CAPILLARY BLOOD GLUCOSE      POCT Blood Glucose.: 168 mg/dL (03 May 2019 07:09)  POCT Blood Glucose.: 195 mg/dL (02 May 2019 21:30)  POCT Blood Glucose.: 96 mg/dL (02 May 2019 16:42)  POCT Blood Glucose.: 227 mg/dL (02 May 2019 11:19)      RADIOLOGY & ADDITIONAL TESTS:    Imaging reports  Personally Reviewed:  [ x] YES  [ ] NO    Consultant(s) Notes Reviewed:  [ x] YES  [ ] NO    Care Discussed with Consultants/Other Providers [ x] YES  [ ] NO  Problem/Plan - 1:  ·  Problem: COPD with exacerbation.  Plan: bronchodilators  steroids  AB  dvt  prophylaxis     Problem/Plan - 2:  ·  Problem: Pneumonia.  Plan: as  above.     Problem/Plan - 3:  ·  Problem: Troponin level elevated.  Plan: probable  spillage monitor  further  w/u  and  treatment  as per  clinical  course    Problem/Plan - 4:  ·  Problem: Chest pain.  Plan: tylenol  xanax.     Problem/Plan - 5:  ·  Problem: Anxiety.  Plan: xanax  zoloft.     Problem/Plan - 6:  Problem: Diabetes. Plan: insulin  coverage.

## 2019-05-03 NOTE — PROGRESS NOTE ADULT - SUBJECTIVE AND OBJECTIVE BOX
Patient is a 74y old  Male who presents with a chief complaint of chest  pain  copd  excerrbation (02 May 2019 14:06)    HPI: patient  c/o  cough  chest pain  ER  found  to  have  elevated  troponin  admitted  for  further  w/u  and  treatment  discussed with  ER  MD 7:40 pm , he   had  discussed with cardiology patient  was going  to  have  ct/angio   of chest patient  to  be  admitted  to  my  service  by  hospitalist.,  was called 9;54 pm by  ER  RN for  admission orders again  advised  that  patient  was  to have been  admitted to my  service  by  Hospitalist   as  per  protocol. patient  admitted  by  NP  Walker to  telemetry  presently   anxious  no  CP  mild  SOB (30 Apr 2019 08:16)    Still having severe SOB with minimal exertion, including eating and talking.     REVIEW OF SYSTEMS as above  Constitutional - See HPI  HEENT - No neck pain  Respiratory - See HPI  Cardiovascular - No chest pain, No palpitations  Gastrointestinal - No abdominal pain, No nausea, No vomiting  Genitourinary - No dysuria, No frequency  Neurological - No headaches, No loss of strength, No numbness, No tremors  Skin - No lesions   Endocrine - No temperature intolerance  Musculoskeletal - No joint pain  Psychiatric - No depression, No anxiety    PAST MEDICAL & SURGICAL HISTORY  Hyperglycemia A1c 6.3  COPD with exacerbation  Hypercholesteremia  Pneumonia  Anxiety and depression  COPD (chronic obstructive pulmonary disease  Traumatic amputation L index dist phal.  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  L hand intrinsic atrophy     Psychiatric - Mood somewhat anxious    RECENT LABS reviewed    IMAGING reviewed:  CXR - R basilar infiltrate  CTA - R CA pneumonia, PHTN, unchanged RUL nodule, pHTN, atherosclerotic changes of the aorta  Dr. Forde - R pneumonia, COPD exac., chronic hypoxic resp failure  Dr. Hernandez - essentially normal TTE, CPK & ECG unchanged  FUNCTIONAL HISTORY Ind amb, ADLs with O2    CURRENT FUNCTIONAL STATUS amb 30 feet with RW, O2 but desat to 81% with therapist - felt exhausted for prolonged period of time afterward    IMPRESSION: 61 M Hx Tob, COPD, anxiety, depression, with multiple hospitalizations since 2013 usually for COPD exac, pneumonia - again has pneumonia, COPD exac, stable cardiac W/U.     PLAN: Will follow - DC rec depends upon functional ability when stable per Dr. Rgean

## 2019-05-04 LAB
GLUCOSE BLDC GLUCOMTR-MCNC: 124 MG/DL — HIGH (ref 70–99)
GLUCOSE BLDC GLUCOMTR-MCNC: 184 MG/DL — HIGH (ref 70–99)
GLUCOSE BLDC GLUCOMTR-MCNC: 247 MG/DL — HIGH (ref 70–99)

## 2019-05-04 RX ORDER — SENNA PLUS 8.6 MG/1
2 TABLET ORAL AT BEDTIME
Qty: 0 | Refills: 0 | Status: DISCONTINUED | OUTPATIENT
Start: 2019-05-04 | End: 2019-05-06

## 2019-05-04 RX ORDER — DOCUSATE SODIUM 100 MG
100 CAPSULE ORAL THREE TIMES A DAY
Qty: 0 | Refills: 0 | Status: DISCONTINUED | OUTPATIENT
Start: 2019-05-04 | End: 2019-05-06

## 2019-05-04 RX ORDER — POLYETHYLENE GLYCOL 3350 17 G/17G
17 POWDER, FOR SOLUTION ORAL DAILY
Qty: 0 | Refills: 0 | Status: DISCONTINUED | OUTPATIENT
Start: 2019-05-04 | End: 2019-05-06

## 2019-05-04 RX ADMIN — BUDESONIDE AND FORMOTEROL FUMARATE DIHYDRATE 2 PUFF(S): 160; 4.5 AEROSOL RESPIRATORY (INHALATION) at 16:56

## 2019-05-04 RX ADMIN — PANTOPRAZOLE SODIUM 40 MILLIGRAM(S): 20 TABLET, DELAYED RELEASE ORAL at 06:29

## 2019-05-04 RX ADMIN — BUDESONIDE AND FORMOTEROL FUMARATE DIHYDRATE 2 PUFF(S): 160; 4.5 AEROSOL RESPIRATORY (INHALATION) at 06:29

## 2019-05-04 RX ADMIN — AZITHROMYCIN 500 MILLIGRAM(S): 500 TABLET, FILM COATED ORAL at 11:45

## 2019-05-04 RX ADMIN — SERTRALINE 25 MILLIGRAM(S): 25 TABLET, FILM COATED ORAL at 11:45

## 2019-05-04 RX ADMIN — Medication 3 MILLILITER(S): at 11:30

## 2019-05-04 RX ADMIN — Medication 3 MILLILITER(S): at 05:22

## 2019-05-04 RX ADMIN — Medication 2: at 11:43

## 2019-05-04 RX ADMIN — Medication 25 MILLIGRAM(S): at 06:29

## 2019-05-04 RX ADMIN — ENOXAPARIN SODIUM 40 MILLIGRAM(S): 100 INJECTION SUBCUTANEOUS at 11:46

## 2019-05-04 RX ADMIN — Medication 0.5 MILLIGRAM(S): at 22:06

## 2019-05-04 RX ADMIN — ATORVASTATIN CALCIUM 40 MILLIGRAM(S): 80 TABLET, FILM COATED ORAL at 22:00

## 2019-05-04 RX ADMIN — Medication 4: at 16:49

## 2019-05-04 RX ADMIN — Medication 40 MILLIGRAM(S): at 11:45

## 2019-05-04 RX ADMIN — Medication 3 MILLILITER(S): at 17:38

## 2019-05-04 RX ADMIN — Medication 40 MILLIGRAM(S): at 06:29

## 2019-05-04 RX ADMIN — Medication 20 MILLIGRAM(S): at 22:00

## 2019-05-04 NOTE — PROGRESS NOTE ADULT - SUBJECTIVE AND OBJECTIVE BOX
74y old  Male who presents with a chief complaint of   CP  Review of Systems:    General:  No wt loss, fevers, chills, night sweats  Eyes:  Good vision, no reported pain  ENT:  No sore throat, pain, runny nose, dysphagia  CV: pain,   Resp:  No dyspnea, cough, tachypnea, wheezing  GI:  No pain, nausea, vomiting, diarrhea, constipation  :  No pain, bleeding, incontinence, nocturia  Muscle:  No pain, weakness  Breast:  No pain, abscess, mass, discharge  Neuro:  No weakness, tingling, memory problems  Psych:  No fatigue, insomnia, mood problems, depression  Endocrine:  No polyuria, polydypsia, cold/heat intolerance  Heme:  No petechiae, ecchymosis, easy bruisability  Skin:  No rash, tattoos, scars, edema         Social History/Family History  SOCHX:   tobacco,  -  alcohol    FMHX: FA/MO  - contributory       Discussed with:  PMD, Family    Physical Exam:    Vital Signs:  Vital Signs Last 24 Hrs  T(C): 37.3 (29 Apr 2019 23:54), Max: 37.3 (29 Apr 2019 23:54)  T(F): 99.2 (29 Apr 2019 23:54), Max: 99.2 (29 Apr 2019 23:54)  HR: 102 (29 Apr 2019 23:54) (102 - 115)  BP: 168/92 (29 Apr 2019 23:54) (140/87 - 168/92)  BP(mean): --  RR: 20 (29 Apr 2019 23:54) (18 - 20)  SpO2: 99% (29 Apr 2019 23:54) (97% - 99%)  Daily Height in cm: 172.72 (29 Apr 2019 23:54)    Daily   I&O's Summary        HEENT:  NC/AT, patent nares w/ pink mucosa, OP clear w/o lesions, PERRL, EOMI, conjunctivae clear, no thyromegaly, nodules, adenopathy, no JVD  Chest:  Full & symmetric excursion, no increased effort, breath sounds clear  Cardiovascular:  Regular rhythm, S1, S2, no murmur/rub/S3/S4, no carotid/femoral/abdominal bruit, radial/pedal pulses 2+, no edema  Abdomen:  Soft, non-tender, non-distended, normoactive bowel sounds, no HSM    Laboratory:                          12.3   8.37  )-----------( 217      ( 29 Apr 2019 18:48 )             40.1     04-29    144  |  104  |  15  ----------------------------<  114<H>  4.3   |  33<H>  |  0.88    Ca    8.7      29 Apr 2019 18:48    TPro  6.5  /  Alb  3.0<L>  /  TBili  0.2  /  DBili  x   /  AST  18  /  ALT  9<L>  /  AlkPhos  61  04-29      CARDIAC MARKERS ( 29 Apr 2019 18:48 )  .158 ng/mL / x     / 47 U/L / x     / 2.7 ng/mL      CAPILLARY BLOOD GLUCOSE      POCT Blood Glucose.: 108 mg/dL (29 Apr 2019 22:24)    LIVER FUNCTIONS - ( 29 Apr 2019 18:48 )  Alb: 3.0 g/dL / Pro: 6.5 gm/dL / ALK PHOS: 61 U/L / ALT: 9 U/L / AST: 18 U/L / GGT: x           PT/INR - ( 29 Apr 2019 18:48 )   PT: 11.2 sec;   INR: 1.00 ratio         PTT - ( 29 Apr 2019 18:48 )  PTT:29.7 sec        Assessment:  CP improving  CE noted.  TTE essentially normal.  CPK and EKG w/o change  Risk factors reviewed  pulmonary management continues  Taper steroids

## 2019-05-04 NOTE — PROGRESS NOTE ADULT - SUBJECTIVE AND OBJECTIVE BOX
INTERVAL HPI:  74 year male with HLD, DM, COPD on home O2, Anxiety and depression. Came with 2 days of sob and chest pain with cough at times. Also reports chills but denies fever, thinks may have started as cold. Admitted with RLL pneumonia and elevated troponin.  Smoked close to 50 years, quit 10 years ago.    OVERNIGHT EVENTS:  Comfortable in chair.    Vital Signs Last 24 Hrs  T(C): 36.3 (04 May 2019 11:51), Max: 36.6 (03 May 2019 16:30)  T(F): 97.4 (04 May 2019 11:51), Max: 97.9 (03 May 2019 16:30)  HR: 99 (04 May 2019 11:51) (80 - 122)  BP: 151/80 (04 May 2019 11:51) (121/76 - 154/89)  BP(mean): --  RR: 19 (04 May 2019 11:51) (18 - 19)  SpO2: 95% (04 May 2019 11:51) (93% - 98%)    PHYSICAL EXAM:  GEN:         Awake, responsive and comfortable.  HEENT:    Normal.    RESP:        no wheezing.  CVS:          Regular rate and rhythm.   ABD:         Soft, non-tender, non-distended;     MEDICATIONS  (STANDING):  ALBUTerol/ipratropium for Nebulization 3 milliLiter(s) Nebulizer every 6 hours  atorvastatin 40 milliGRAM(s) Oral at bedtime  azithromycin   Tablet 500 milliGRAM(s) Oral daily  buDESOnide 160 MICROgram(s)/formoterol 4.5 MICROgram(s) Inhaler 2 Puff(s) Inhalation two times a day  cefTRIAXone   IVPB      cefTRIAXone   IVPB 1 Gram(s) IV Intermittent every 24 hours  dextrose 5%. 1000 milliLiter(s) (50 mL/Hr) IV Continuous <Continuous>  dextrose 50% Injectable 12.5 Gram(s) IV Push once  dextrose 50% Injectable 25 Gram(s) IV Push once  dextrose 50% Injectable 25 Gram(s) IV Push once  enoxaparin Injectable 40 milliGRAM(s) SubCutaneous daily  insulin lispro (HumaLOG) corrective regimen sliding scale   SubCutaneous three times a day before meals  insulin lispro (HumaLOG) corrective regimen sliding scale   SubCutaneous at bedtime  methylPREDNISolone sodium succinate Injectable 40 milliGRAM(s) IV Push three times a day  metoprolol succinate ER 25 milliGRAM(s) Oral daily  pantoprazole    Tablet 40 milliGRAM(s) Oral before breakfast  sertraline 25 milliGRAM(s) Oral daily    MEDICATIONS  (PRN):  ALPRAZolam 0.5 milliGRAM(s) Oral three times a day PRN anxiety  dextrose 40% Gel 15 Gram(s) Oral once PRN Blood Glucose LESS THAN 70 milliGRAM(s)/deciliter  glucagon  Injectable 1 milliGRAM(s) IntraMuscular once PRN Glucose LESS THAN 70 milligrams/deciliter  sodium chloride 0.65% Nasal 1 Spray(s) Both Nostrils every 6 hours PRN Nasal Congestion    ASSESSMENT AND PLAN:  ·	SOB.  ·	RLL Pneumonia, CAP.  ·	Acute COPD exacerbation.  ·	Chronic Hypoxic Respiratory failure.  ·	Elevated troponin, seen by Cardiology.  ·	DM.  ·	HLD.  ·	Anxiety and Depression.    Steroids taper, continue O2, nebulizer and antibiotics.

## 2019-05-05 LAB
GLUCOSE BLDC GLUCOMTR-MCNC: 113 MG/DL — HIGH (ref 70–99)
GLUCOSE BLDC GLUCOMTR-MCNC: 124 MG/DL — HIGH (ref 70–99)
GLUCOSE BLDC GLUCOMTR-MCNC: 155 MG/DL — HIGH (ref 70–99)
GLUCOSE BLDC GLUCOMTR-MCNC: 166 MG/DL — HIGH (ref 70–99)

## 2019-05-05 RX ORDER — SERTRALINE 25 MG/1
50 TABLET, FILM COATED ORAL DAILY
Qty: 0 | Refills: 0 | Status: DISCONTINUED | OUTPATIENT
Start: 2019-05-05 | End: 2019-05-06

## 2019-05-05 RX ORDER — WARFARIN SODIUM 2.5 MG/1
5 TABLET ORAL ONCE
Qty: 0 | Refills: 0 | Status: DISCONTINUED | OUTPATIENT
Start: 2019-05-05 | End: 2019-05-05

## 2019-05-05 RX ADMIN — Medication 100 MILLIGRAM(S): at 05:29

## 2019-05-05 RX ADMIN — Medication 2: at 07:37

## 2019-05-05 RX ADMIN — ENOXAPARIN SODIUM 40 MILLIGRAM(S): 100 INJECTION SUBCUTANEOUS at 11:01

## 2019-05-05 RX ADMIN — BUDESONIDE AND FORMOTEROL FUMARATE DIHYDRATE 2 PUFF(S): 160; 4.5 AEROSOL RESPIRATORY (INHALATION) at 05:29

## 2019-05-05 RX ADMIN — Medication 3 MILLILITER(S): at 17:15

## 2019-05-05 RX ADMIN — Medication 2: at 11:00

## 2019-05-05 RX ADMIN — SERTRALINE 50 MILLIGRAM(S): 25 TABLET, FILM COATED ORAL at 11:01

## 2019-05-05 RX ADMIN — Medication 3 MILLILITER(S): at 23:34

## 2019-05-05 RX ADMIN — Medication 20 MILLIGRAM(S): at 05:29

## 2019-05-05 RX ADMIN — CEFTRIAXONE 100 GRAM(S): 500 INJECTION, POWDER, FOR SOLUTION INTRAMUSCULAR; INTRAVENOUS at 07:38

## 2019-05-05 RX ADMIN — Medication 3 MILLILITER(S): at 11:13

## 2019-05-05 RX ADMIN — Medication 20 MILLIGRAM(S): at 11:01

## 2019-05-05 RX ADMIN — PANTOPRAZOLE SODIUM 40 MILLIGRAM(S): 20 TABLET, DELAYED RELEASE ORAL at 06:34

## 2019-05-05 RX ADMIN — Medication 25 MILLIGRAM(S): at 05:30

## 2019-05-05 RX ADMIN — Medication 3 MILLILITER(S): at 05:47

## 2019-05-05 RX ADMIN — POLYETHYLENE GLYCOL 3350 17 GRAM(S): 17 POWDER, FOR SOLUTION ORAL at 05:31

## 2019-05-05 RX ADMIN — AZITHROMYCIN 500 MILLIGRAM(S): 500 TABLET, FILM COATED ORAL at 11:01

## 2019-05-05 RX ADMIN — Medication 100 MILLIGRAM(S): at 11:01

## 2019-05-05 RX ADMIN — Medication 3 MILLILITER(S): at 00:25

## 2019-05-05 NOTE — PROGRESS NOTE ADULT - SUBJECTIVE AND OBJECTIVE BOX
INTERVAL HPI/OVERNIGHT EVENTS:        REVIEW OF SYSTEMS:  CONSTITUTIONAL:  c/o  anxiety  fatigue     NECK: No pain or stiffnes  RESPIRATORY:  villan[place]  CARDIOVASCULAR: No chest pain, palpitations, dizziness,   GASTROINTESTINAL: No abdominal pain. No nausea, vomiting,   NEUROLOGICAL: No headaches, no  blurry  vision no  dizziness  SKIN: No itching,   MUSCULOSKELETAL: No pain    MEDICATION:  ALBUTerol/ipratropium for Nebulization 3 milliLiter(s) Nebulizer every 6 hours  ALPRAZolam 0.5 milliGRAM(s) Oral three times a day PRN  atorvastatin 40 milliGRAM(s) Oral at bedtime  azithromycin   Tablet 500 milliGRAM(s) Oral daily  buDESOnide 160 MICROgram(s)/formoterol 4.5 MICROgram(s) Inhaler 2 Puff(s) Inhalation two times a day  cefTRIAXone   IVPB 1 Gram(s) IV Intermittent every 24 hours  cefTRIAXone   IVPB      dextrose 40% Gel 15 Gram(s) Oral once PRN  dextrose 5%. 1000 milliLiter(s) IV Continuous <Continuous>  dextrose 50% Injectable 12.5 Gram(s) IV Push once  dextrose 50% Injectable 25 Gram(s) IV Push once  dextrose 50% Injectable 25 Gram(s) IV Push once  docusate sodium 100 milliGRAM(s) Oral three times a day  enoxaparin Injectable 40 milliGRAM(s) SubCutaneous daily  glucagon  Injectable 1 milliGRAM(s) IntraMuscular once PRN  insulin lispro (HumaLOG) corrective regimen sliding scale   SubCutaneous three times a day before meals  insulin lispro (HumaLOG) corrective regimen sliding scale   SubCutaneous at bedtime  methylPREDNISolone sodium succinate Injectable 20 milliGRAM(s) IV Push every 8 hours  metoprolol succinate ER 25 milliGRAM(s) Oral daily  pantoprazole    Tablet 40 milliGRAM(s) Oral before breakfast  polyethylene glycol 3350 17 Gram(s) Oral daily  senna 2 Tablet(s) Oral at bedtime  sertraline 25 milliGRAM(s) Oral daily  sodium chloride 0.65% Nasal 1 Spray(s) Both Nostrils every 6 hours PRN    Vital Signs Last 24 Hrs  T(C): 35.9 (05 May 2019 05:40), Max: 36.8 (04 May 2019 17:13)  T(F): 96.7 (05 May 2019 05:40), Max: 98.3 (04 May 2019 17:13)  HR: 82 (05 May 2019 05:40) (80 - 107)  BP: 142/78 (05 May 2019 05:40) (127/81 - 160/98)  BP(mean): --  RR: 18 (05 May 2019 05:40) (18 - 19)  SpO2: 95% (05 May 2019 05:40) (92% - 97%)    PHYSICAL EXAM:  GENERAL: NAD, well-groomed, well-developed  EYES:  conjunctiva and sclera clear  ENMT:  Moist mucous membranes,   NECK: Supple, No JVD, Normal thyroid  NERVOUS SYSTEM:  Alert oriented   no  focal  deficits;   CHEST/LUNG: decreased  bs    HEART: Regular rate and rhythm; No murmurs, rubs, or gallops  ABDOMEN: Soft, Nontender, Nondistended; Bowel sounds present  EXTREMITIES:  no  edema no  tenderness  SKIN: No rashes   LABS:              CAPILLARY BLOOD GLUCOSE      POCT Blood Glucose.: 155 mg/dL (05 May 2019 07:36)  POCT Blood Glucose.: 124 mg/dL (04 May 2019 21:33)  POCT Blood Glucose.: 247 mg/dL (04 May 2019 16:48)  POCT Blood Glucose.: 184 mg/dL (04 May 2019 11:43)      RADIOLOGY & ADDITIONAL TESTS:    Imaging reports  Personally Reviewed:  [x ] YES  [ ] NO    Consultant(s) Notes Reviewed:  [x ] YES  [ ] NO    Care Discussed with Consultants/Other Providers [ x] YES  [ ] NO  Problem/Plan - 1:  ·  Problem: COPD with exacerbation.  Plan: bronchodilators  steroids  AB  dvt  prophylaxis     Problem/Plan - 2:  ·  Problem: Pneumonia.  Plan: as  above.     Problem/Plan - 3:  ·  Problem: Troponin level elevated.  Plan: probable  spillage monitor  further  w/u  and  treatment  as per  clinical  course    Problem/Plan - 4:  ·  Problem: Chest pain.  Plan: tylenol  xanax.     Problem/Plan - 5:  ·  Problem: Anxiety.  Plan: xanax  increase  zoloft    Problem/Plan - 6:  Problem: Diabetes. Plan: insulin  coverage.  for  discharge  to  rehab  addendum  patient  was  seen  examined  5/4/19 note  not  on  chart INTERVAL HPI/OVERNIGHT EVENTS:        REVIEW OF SYSTEMS:  CONSTITUTIONAL:  c/o  anxiety  fatigue     NECK: No pain or stiffnes  RESPIRATORY:  [place]an[place]  CARDIOVASCULAR: No chest pain, palpitations, dizziness,   GASTROINTESTINAL: No abdominal pain. No nausea, vomiting,   NEUROLOGICAL: No headaches, no  blurry  vision no  dizziness  SKIN: No itching,   MUSCULOSKELETAL: No pain    MEDICATION:  ALBUTerol/ipratropium for Nebulization 3 milliLiter(s) Nebulizer every 6 hours  ALPRAZolam 0.5 milliGRAM(s) Oral three times a day PRN  atorvastatin 40 milliGRAM(s) Oral at bedtime  azithromycin   Tablet 500 milliGRAM(s) Oral daily  buDESOnide 160 MICROgram(s)/formoterol 4.5 MICROgram(s) Inhaler 2 Puff(s) Inhalation two times a day  cefTRIAXone   IVPB 1 Gram(s) IV Intermittent every 24 hours  cefTRIAXone   IVPB      dextrose 40% Gel 15 Gram(s) Oral once PRN  dextrose 5%. 1000 milliLiter(s) IV Continuous <Continuous>  dextrose 50% Injectable 12.5 Gram(s) IV Push once  dextrose 50% Injectable 25 Gram(s) IV Push once  dextrose 50% Injectable 25 Gram(s) IV Push once  docusate sodium 100 milliGRAM(s) Oral three times a day  enoxaparin Injectable 40 milliGRAM(s) SubCutaneous daily  glucagon  Injectable 1 milliGRAM(s) IntraMuscular once PRN  insulin lispro (HumaLOG) corrective regimen sliding scale   SubCutaneous three times a day before meals  insulin lispro (HumaLOG) corrective regimen sliding scale   SubCutaneous at bedtime  methylPREDNISolone sodium succinate Injectable 20 milliGRAM(s) IV Push every 8 hours  metoprolol succinate ER 25 milliGRAM(s) Oral daily  pantoprazole    Tablet 40 milliGRAM(s) Oral before breakfast  polyethylene glycol 3350 17 Gram(s) Oral daily  senna 2 Tablet(s) Oral at bedtime  sertraline 25 milliGRAM(s) Oral daily  sodium chloride 0.65% Nasal 1 Spray(s) Both Nostrils every 6 hours PRN    Vital Signs Last 24 Hrs  T(C): 35.9 (05 May 2019 05:40), Max: 36.8 (04 May 2019 17:13)  T(F): 96.7 (05 May 2019 05:40), Max: 98.3 (04 May 2019 17:13)  HR: 82 (05 May 2019 05:40) (80 - 107)  BP: 142/78 (05 May 2019 05:40) (127/81 - 160/98)  BP(mean): --  RR: 18 (05 May 2019 05:40) (18 - 19)  SpO2: 95% (05 May 2019 05:40) (92% - 97%)    PHYSICAL EXAM:  GENERAL: NAD, well-groomed, well-developed  EYES:  conjunctiva and sclera clear  ENMT:  Moist mucous membranes,   NECK: Supple, No JVD, Normal thyroid  NERVOUS SYSTEM:  Alert oriented   no  focal  deficits;   CHEST/LUNG: decreased  bs    HEART: Regular rate and rhythm; No murmurs, rubs, or gallops  ABDOMEN: Soft, Nontender, Nondistended; Bowel sounds present  EXTREMITIES:  no  edema no  tenderness  SKIN: No rashes   LABS:              CAPILLARY BLOOD GLUCOSE      POCT Blood Glucose.: 155 mg/dL (05 May 2019 07:36)  POCT Blood Glucose.: 124 mg/dL (04 May 2019 21:33)  POCT Blood Glucose.: 247 mg/dL (04 May 2019 16:48)  POCT Blood Glucose.: 184 mg/dL (04 May 2019 11:43)      RADIOLOGY & ADDITIONAL TESTS:    Imaging reports  Personally Reviewed:  [x ] YES  [ ] NO    Consultant(s) Notes Reviewed:  [x ] YES  [ ] NO    Care Discussed with Consultants/Other Providers [ x] YES  [ ] NO  Problem/Plan - 1:  ·  Problem: COPD with exacerbation.  Plan: bronchodilators  steroids  AB  dvt  prophylaxis     Problem/Plan - 2:  ·  Problem: Pneumonia.  Plan: as  above.     Problem/Plan - 3:  ·  Problem: Troponin level elevated.  Plan: probable  spillage monitor  further  w/u  and  treatment  as per  clinical  course    Problem/Plan - 4:  ·  Problem: Chest pain.  Plan: tylenol  xanax.     Problem/Plan - 5:  ·  Problem: Anxiety.  Plan: xanax  increase  zoloft    Problem/Plan - 6:  Problem: Diabetes. Plan: insulin  coverage.  for  discharge  to  rehab  addendum  patient  was  seen  examined orders written  5/4/19 note  not  on  chart

## 2019-05-05 NOTE — PROGRESS NOTE ADULT - SUBJECTIVE AND OBJECTIVE BOX
INTERVAL HPI:  74 year male with HLD, DM, COPD on home O2, Anxiety and depression. Came with 2 days of sob and chest pain with cough at times. Also reports chills but denies fever, thinks may have started as cold. Admitted with RLL pneumonia and elevated troponin.  Smoked close to 50 years, quit 10 years ago.    OVERNIGHT EVENTS:  reports abdominal discomfort and constipation.    Vital Signs Last 24 Hrs  T(C): 36.2 (05 May 2019 16:15), Max: 36.7 (05 May 2019 12:57)  T(F): 97.2 (05 May 2019 16:15), Max: 98 (05 May 2019 12:57)  HR: 98 (05 May 2019 17:30) (82 - 110)  BP: 146/87 (05 May 2019 16:15) (127/81 - 146/87)  BP(mean): --  RR: 18 (05 May 2019 16:15) (18 - 19)  SpO2: 96% (05 May 2019 17:30) (92% - 98%)    PHYSICAL EXAM:  GEN:         Awake, responsive and comfortable.  HEENT:     Normal.    RESP:        no wheezing.  CVS:          Regular rate and rhythm.   ABD:         Soft, non-tender, non-distended;     MEDICATIONS  (STANDING):  ALBUTerol/ipratropium for Nebulization 3 milliLiter(s) Nebulizer every 6 hours  atorvastatin 40 milliGRAM(s) Oral at bedtime  azithromycin   Tablet 500 milliGRAM(s) Oral daily  buDESOnide 160 MICROgram(s)/formoterol 4.5 MICROgram(s) Inhaler 2 Puff(s) Inhalation two times a day  cefTRIAXone   IVPB 1 Gram(s) IV Intermittent every 24 hours  cefTRIAXone   IVPB      dextrose 5%. 1000 milliLiter(s) (50 mL/Hr) IV Continuous <Continuous>  dextrose 50% Injectable 12.5 Gram(s) IV Push once  dextrose 50% Injectable 25 Gram(s) IV Push once  dextrose 50% Injectable 25 Gram(s) IV Push once  docusate sodium 100 milliGRAM(s) Oral three times a day  enoxaparin Injectable 40 milliGRAM(s) SubCutaneous daily  insulin lispro (HumaLOG) corrective regimen sliding scale   SubCutaneous three times a day before meals  insulin lispro (HumaLOG) corrective regimen sliding scale   SubCutaneous at bedtime  methylPREDNISolone sodium succinate Injectable 20 milliGRAM(s) IV Push every 8 hours  metoprolol succinate ER 25 milliGRAM(s) Oral daily  pantoprazole    Tablet 40 milliGRAM(s) Oral before breakfast  polyethylene glycol 3350 17 Gram(s) Oral daily  senna 2 Tablet(s) Oral at bedtime  sertraline 50 milliGRAM(s) Oral daily    MEDICATIONS  (PRN):  ALPRAZolam 0.5 milliGRAM(s) Oral three times a day PRN anxiety  dextrose 40% Gel 15 Gram(s) Oral once PRN Blood Glucose LESS THAN 70 milliGRAM(s)/deciliter  glucagon  Injectable 1 milliGRAM(s) IntraMuscular once PRN Glucose LESS THAN 70 milligrams/deciliter  sodium chloride 0.65% Nasal 1 Spray(s) Both Nostrils every 6 hours PRN Nasal Congestion    ASSESSMENT AND PLAN:  ·	SOB.  ·	RLL Pneumonia, CAP.  ·	Acute COPD exacerbation.  ·	Chronic Hypoxic Respiratory failure.  ·	Elevated troponin, seen by Cardiology.  ·	DM.  ·	HLD.  ·	Anxiety and Depression.    Continue antibiotics, steroids and nebulizer.  Laxatives.  Discussed with family.

## 2019-05-06 ENCOUNTER — TRANSCRIPTION ENCOUNTER (OUTPATIENT)
Age: 74
End: 2019-05-06

## 2019-05-06 VITALS — OXYGEN SATURATION: 93 %

## 2019-05-06 LAB
ALBUMIN SERPL ELPH-MCNC: 2.9 G/DL — LOW (ref 3.3–5)
ALP SERPL-CCNC: 52 U/L — SIGNIFICANT CHANGE UP (ref 40–120)
ALT FLD-CCNC: 21 U/L — SIGNIFICANT CHANGE UP (ref 12–78)
ANION GAP SERPL CALC-SCNC: 7 MMOL/L — SIGNIFICANT CHANGE UP (ref 5–17)
AST SERPL-CCNC: 27 U/L — SIGNIFICANT CHANGE UP (ref 15–37)
BILIRUB SERPL-MCNC: 0.6 MG/DL — SIGNIFICANT CHANGE UP (ref 0.2–1.2)
BUN SERPL-MCNC: 20 MG/DL — SIGNIFICANT CHANGE UP (ref 7–23)
CALCIUM SERPL-MCNC: 8.6 MG/DL — SIGNIFICANT CHANGE UP (ref 8.5–10.1)
CHLORIDE SERPL-SCNC: 94 MMOL/L — LOW (ref 96–108)
CO2 SERPL-SCNC: 36 MMOL/L — HIGH (ref 22–31)
CREAT SERPL-MCNC: 0.64 MG/DL — SIGNIFICANT CHANGE UP (ref 0.5–1.3)
GLUCOSE BLDC GLUCOMTR-MCNC: 145 MG/DL — HIGH (ref 70–99)
GLUCOSE BLDC GLUCOMTR-MCNC: 189 MG/DL — HIGH (ref 70–99)
GLUCOSE BLDC GLUCOMTR-MCNC: 208 MG/DL — HIGH (ref 70–99)
GLUCOSE BLDC GLUCOMTR-MCNC: 229 MG/DL — HIGH (ref 70–99)
GLUCOSE SERPL-MCNC: 182 MG/DL — HIGH (ref 70–99)
HCT VFR BLD CALC: 42.9 % — SIGNIFICANT CHANGE UP (ref 39–50)
HGB BLD-MCNC: 12.9 G/DL — LOW (ref 13–17)
INR BLD: 0.97 RATIO — SIGNIFICANT CHANGE UP (ref 0.88–1.16)
MCHC RBC-ENTMCNC: 27.3 PG — SIGNIFICANT CHANGE UP (ref 27–34)
MCHC RBC-ENTMCNC: 30.1 GM/DL — LOW (ref 32–36)
MCV RBC AUTO: 90.7 FL — SIGNIFICANT CHANGE UP (ref 80–100)
NRBC # BLD: 0 /100 WBCS — SIGNIFICANT CHANGE UP (ref 0–0)
PLATELET # BLD AUTO: 247 K/UL — SIGNIFICANT CHANGE UP (ref 150–400)
POTASSIUM SERPL-MCNC: 5.1 MMOL/L — SIGNIFICANT CHANGE UP (ref 3.5–5.3)
POTASSIUM SERPL-SCNC: 5.1 MMOL/L — SIGNIFICANT CHANGE UP (ref 3.5–5.3)
PROT SERPL-MCNC: 5.9 GM/DL — LOW (ref 6–8.3)
PROTHROM AB SERPL-ACNC: 10.8 SEC — SIGNIFICANT CHANGE UP (ref 10–12.9)
RBC # BLD: 4.73 M/UL — SIGNIFICANT CHANGE UP (ref 4.2–5.8)
RBC # FLD: 14.4 % — SIGNIFICANT CHANGE UP (ref 10.3–14.5)
SODIUM SERPL-SCNC: 137 MMOL/L — SIGNIFICANT CHANGE UP (ref 135–145)
WBC # BLD: 11.48 K/UL — HIGH (ref 3.8–10.5)
WBC # FLD AUTO: 11.48 K/UL — HIGH (ref 3.8–10.5)

## 2019-05-06 RX ORDER — POLYETHYLENE GLYCOL 3350 17 G/17G
17 POWDER, FOR SOLUTION ORAL
Qty: 0 | Refills: 0 | COMMUNITY
Start: 2019-05-06

## 2019-05-06 RX ORDER — MAGNESIUM HYDROXIDE 400 MG/1
30 TABLET, CHEWABLE ORAL ONCE
Qty: 0 | Refills: 0 | Status: COMPLETED | OUTPATIENT
Start: 2019-05-06 | End: 2019-05-06

## 2019-05-06 RX ORDER — SENNA PLUS 8.6 MG/1
2 TABLET ORAL
Qty: 0 | Refills: 0 | COMMUNITY
Start: 2019-05-06

## 2019-05-06 RX ORDER — SERTRALINE 25 MG/1
1 TABLET, FILM COATED ORAL
Qty: 0 | Refills: 0 | COMMUNITY
Start: 2019-05-06

## 2019-05-06 RX ADMIN — Medication 20 MILLIGRAM(S): at 00:06

## 2019-05-06 RX ADMIN — POLYETHYLENE GLYCOL 3350 17 GRAM(S): 17 POWDER, FOR SOLUTION ORAL at 15:24

## 2019-05-06 RX ADMIN — PANTOPRAZOLE SODIUM 40 MILLIGRAM(S): 20 TABLET, DELAYED RELEASE ORAL at 06:19

## 2019-05-06 RX ADMIN — CEFTRIAXONE 100 GRAM(S): 500 INJECTION, POWDER, FOR SOLUTION INTRAMUSCULAR; INTRAVENOUS at 08:34

## 2019-05-06 RX ADMIN — SENNA PLUS 2 TABLET(S): 8.6 TABLET ORAL at 00:07

## 2019-05-06 RX ADMIN — Medication 20 MILLIGRAM(S): at 15:25

## 2019-05-06 RX ADMIN — Medication 2: at 08:22

## 2019-05-06 RX ADMIN — MAGNESIUM HYDROXIDE 30 MILLILITER(S): 400 TABLET, CHEWABLE ORAL at 15:25

## 2019-05-06 RX ADMIN — BUDESONIDE AND FORMOTEROL FUMARATE DIHYDRATE 2 PUFF(S): 160; 4.5 AEROSOL RESPIRATORY (INHALATION) at 19:17

## 2019-05-06 RX ADMIN — Medication 100 MILLIGRAM(S): at 00:06

## 2019-05-06 RX ADMIN — Medication 25 MILLIGRAM(S): at 06:19

## 2019-05-06 RX ADMIN — AZITHROMYCIN 500 MILLIGRAM(S): 500 TABLET, FILM COATED ORAL at 15:24

## 2019-05-06 RX ADMIN — Medication 3 MILLILITER(S): at 11:24

## 2019-05-06 RX ADMIN — Medication 100 MILLIGRAM(S): at 06:18

## 2019-05-06 RX ADMIN — BUDESONIDE AND FORMOTEROL FUMARATE DIHYDRATE 2 PUFF(S): 160; 4.5 AEROSOL RESPIRATORY (INHALATION) at 06:18

## 2019-05-06 RX ADMIN — SERTRALINE 50 MILLIGRAM(S): 25 TABLET, FILM COATED ORAL at 15:25

## 2019-05-06 RX ADMIN — Medication 100 MILLIGRAM(S): at 15:25

## 2019-05-06 RX ADMIN — ENOXAPARIN SODIUM 40 MILLIGRAM(S): 100 INJECTION SUBCUTANEOUS at 15:24

## 2019-05-06 RX ADMIN — Medication 20 MILLIGRAM(S): at 06:19

## 2019-05-06 RX ADMIN — ATORVASTATIN CALCIUM 40 MILLIGRAM(S): 80 TABLET, FILM COATED ORAL at 00:05

## 2019-05-06 RX ADMIN — Medication 3 MILLILITER(S): at 05:20

## 2019-05-06 RX ADMIN — Medication 0.5 MILLIGRAM(S): at 00:46

## 2019-05-06 RX ADMIN — Medication 3 MILLILITER(S): at 17:02

## 2019-05-06 RX ADMIN — Medication 4: at 13:28

## 2019-05-06 NOTE — DISCHARGE NOTE PROVIDER - CARE PROVIDER_API CALL
Luca Regan)  [facility] Medicine  [address] [place] [address]  Phone: [phone]  Fax: [phone]  Follow Up Time:     Jose Juan Blackwell)  Cardiology  [address], [address]  [place] [address]  Phone: [phone]  Fax: [phone]  Follow Up Time:     Devyn Forde)  Medicine  [address], [address]  [place] [address]  Phone: [phone]  Fax: [phone]  Follow Up Time:

## 2019-05-06 NOTE — DISCHARGE NOTE PROVIDER - NSDCCPCAREPLAN_GEN_ALL_CORE_FT
PRINCIPAL DISCHARGE DIAGNOSIS  Diagnosis: COPD with exacerbation  Assessment and Plan of Treatment:       SECONDARY DISCHARGE DIAGNOSES  Diagnosis: Pneumonia involving right lung  Assessment and Plan of Treatment:     Diagnosis: Troponin level elevated  Assessment and Plan of Treatment:

## 2019-05-06 NOTE — DISCHARGE NOTE PROVIDER - CARE PROVIDERS DIRECT ADDRESSES
[email or web address]

## 2019-05-06 NOTE — DISCHARGE NOTE PROVIDER - HOSPITAL COURSE
patient  treated  on  telemetry  for  copd  exacerbation chest pain pneumonia increased  troponins  clinically  improved  continued  with  POST  anxiety  continues  with  O2  evaluated  by  cardiology  pulmonary  Physiatry  Pt  discharged  to  rehab

## 2019-05-06 NOTE — DISCHARGE NOTE PROVIDER - PROVIDER TOKENS
PROVIDER:[TOKEN:[[id number]]],PROVIDER:[TOKEN:[[id number]]],PROVIDER:[TOKEN:[[id number]]]

## 2019-05-06 NOTE — PROGRESS NOTE ADULT - REASON FOR ADMISSION
chest  pain  copd  excerebration
chest  pain  copd  excerrbation
chest  pain  copd  excerrbation
chest  pain  copd  exacerbation
chest  pain  copd  excerebration
chest  pain  copd  excerebration
chest  pain  copd  excerrbation

## 2019-05-06 NOTE — PROGRESS NOTE ADULT - SUBJECTIVE AND OBJECTIVE BOX
INTERVAL HPI:   74 year male with HLD, DM, COPD on home O2, Anxiety and depression. Came with 2 days of sob and chest pain with cough at times. Also reports chills but denies fever, thinks may have started as cold. Admitted with RLL pneumonia and elevated troponin.  Smoked close to 50 years, quit 10 years ago.    OVERNIGHT EVENTS:  Out of bed to chair and feels better.    Vital Signs Last 24 Hrs  T(C): 36.8 (06 May 2019 11:06), Max: 36.8 (06 May 2019 00:11)  T(F): 98.2 (06 May 2019 11:06), Max: 98.2 (06 May 2019 00:11)  HR: 105 (06 May 2019 12:01) (98 - 114)  BP: 133/84 (06 May 2019 11:06) (128/74 - 146/87)  BP(mean): --  RR: 19 (06 May 2019 11:06) (18 - 19)  SpO2: 93% (06 May 2019 12:01) (93% - 98%)    PHYSICAL EXAM:  GEN:         Awake, responsive and comfortable.  HEENT:    Normal.    RESP:        wheezing.  CVS:           Regular rate and rhythm.   ABD:         Soft, non-tender, non-distended;     MEDICATIONS  (STANDING):  ALBUTerol/ipratropium for Nebulization 3 milliLiter(s) Nebulizer every 6 hours  atorvastatin 40 milliGRAM(s) Oral at bedtime  azithromycin   Tablet 500 milliGRAM(s) Oral daily  buDESOnide 160 MICROgram(s)/formoterol 4.5 MICROgram(s) Inhaler 2 Puff(s) Inhalation two times a day  cefTRIAXone   IVPB 1 Gram(s) IV Intermittent every 24 hours  cefTRIAXone   IVPB      dextrose 5%. 1000 milliLiter(s) (50 mL/Hr) IV Continuous <Continuous>  dextrose 50% Injectable 12.5 Gram(s) IV Push once  dextrose 50% Injectable 25 Gram(s) IV Push once  dextrose 50% Injectable 25 Gram(s) IV Push once  docusate sodium 100 milliGRAM(s) Oral three times a day  enoxaparin Injectable 40 milliGRAM(s) SubCutaneous daily  insulin lispro (HumaLOG) corrective regimen sliding scale   SubCutaneous three times a day before meals  insulin lispro (HumaLOG) corrective regimen sliding scale   SubCutaneous at bedtime  methylPREDNISolone sodium succinate Injectable 20 milliGRAM(s) IV Push every 8 hours  metoprolol succinate ER 25 milliGRAM(s) Oral daily  pantoprazole    Tablet 40 milliGRAM(s) Oral before breakfast  polyethylene glycol 3350 17 Gram(s) Oral daily  senna 2 Tablet(s) Oral at bedtime  sertraline 50 milliGRAM(s) Oral daily    MEDICATIONS  (PRN):  ALPRAZolam 0.5 milliGRAM(s) Oral three times a day PRN anxiety  dextrose 40% Gel 15 Gram(s) Oral once PRN Blood Glucose LESS THAN 70 milliGRAM(s)/deciliter  glucagon  Injectable 1 milliGRAM(s) IntraMuscular once PRN Glucose LESS THAN 70 milligrams/deciliter  sodium chloride 0.65% Nasal 1 Spray(s) Both Nostrils every 6 hours PRN Nasal Congestion    LABS:                        12.9   11.48 )-----------( 247      ( 06 May 2019 06:27 )             42.9     05-06    137  |  94<L>  |  20  ----------------------------<  182<H>  5.1   |  36<H>  |  0.64    Ca    8.6      06 May 2019 06:27    TPro  5.9<L>  /  Alb  2.9<L>  /  TBili  0.6  /  DBili  x   /  AST  27  /  ALT  21  /  AlkPhos  52  05-06    PT/INR - ( 06 May 2019 06:27 )   PT: 10.8 sec;   INR: 0.97 ratio       ASSESSMENT AND PLAN:  ·	SOB.  ·	RLL Pneumonia, CAP.  ·	Acute COPD exacerbation.  ·	Chronic Hypoxic Respiratory failure.  ·	Elevated troponin, seen by Cardiology.  ·	DM.  ·	HLD.  ·	Anxiety and Depression.    Continue O2, nebulizer, and Symbicort, steroids  For [facility] transfer.

## 2019-05-06 NOTE — DISCHARGE NOTE NURSING/CASE MANAGEMENT/SOCIAL WORK - NSDCDPATPORTLINK_GEN_ALL_CORE
You can access the [facility] Patient Portal, offered by [facility], by registering with the following website: [email or web address]

## 2019-05-06 NOTE — PROGRESS NOTE ADULT - PROVIDER SPECIALTY LIST ADULT
Cardiology
Internal Medicine
Physiatry
Physiatry
Pulmonology
Cardiology
Cardiology
Pulmonology

## 2019-05-06 NOTE — PROGRESS NOTE ADULT - SUBJECTIVE AND OBJECTIVE BOX
74y old  Male who presents with a chief complaint of   CP  Review of Systems:    General:  No wt loss, fevers, chills, night sweats  Eyes:  Good vision, no reported pain  ENT:  No sore throat, pain, runny nose, dysphagia  CV: pain,   Resp:  No dyspnea, cough, tachypnea, wheezing  GI:  No pain, nausea, vomiting, diarrhea, constipation  :  No pain, bleeding, incontinence, nocturia  Muscle:  No pain, weakness  Breast:  No pain, abscess, mass, discharge  Neuro:  No weakness, tingling, memory problems  Psych:  No fatigue, insomnia, mood problems, depression  Endocrine:  No polyuria, polydypsia, cold/heat intolerance  Heme:  No petechiae, ecchymosis, easy bruisability  Skin:  No rash, tattoos, scars, edema         Social History/Family History  SOCHX:   tobacco,  -  alcohol    FMHX: FA/MO  - contributory       Discussed with:  PMD, Family    Physical Exam:    Vital Signs:  Vital Signs Last 24 Hrs  T(C): 37.3 (29 Apr 2019 23:54), Max: 37.3 (29 Apr 2019 23:54)  T(F): 99.2 (29 Apr 2019 23:54), Max: 99.2 (29 Apr 2019 23:54)  HR: 102 (29 Apr 2019 23:54) (102 - 115)  BP: 168/92 (29 Apr 2019 23:54) (140/87 - 168/92)  BP(mean): --  RR: 20 (29 Apr 2019 23:54) (18 - 20)  SpO2: 99% (29 Apr 2019 23:54) (97% - 99%)  Daily Height in cm: 172.72 (29 Apr 2019 23:54)    Daily   I&O's Summary        HEENT:  NC/AT, patent nares w/ pink mucosa, OP clear w/o lesions, PERRL, EOMI, conjunctivae clear, no thyromegaly, nodules, adenopathy, no JVD  Chest:  Full & symmetric excursion, no increased effort, breath sounds clear  Cardiovascular:  Regular rhythm, S1, S2, no murmur/rub/S3/S4, no carotid/femoral/abdominal bruit, radial/pedal pulses 2+, no edema  Abdomen:  Soft, non-tender, non-distended, normoactive bowel sounds, no HSM    Laboratory:                          12.3   8.37  )-----------( 217      ( 29 Apr 2019 18:48 )             40.1     04-29    144  |  104  |  15  ----------------------------<  114<H>  4.3   |  33<H>  |  0.88    Ca    8.7      29 Apr 2019 18:48    TPro  6.5  /  Alb  3.0<L>  /  TBili  0.2  /  DBili  x   /  AST  18  /  ALT  9<L>  /  AlkPhos  61  04-29      CARDIAC MARKERS ( 29 Apr 2019 18:48 )  .158 ng/mL / x     / 47 U/L / x     / 2.7 ng/mL      CAPILLARY BLOOD GLUCOSE      POCT Blood Glucose.: 108 mg/dL (29 Apr 2019 22:24)    LIVER FUNCTIONS - ( 29 Apr 2019 18:48 )  Alb: 3.0 g/dL / Pro: 6.5 gm/dL / ALK PHOS: 61 U/L / ALT: 9 U/L / AST: 18 U/L / GGT: x           PT/INR - ( 29 Apr 2019 18:48 )   PT: 11.2 sec;   INR: 1.00 ratio         PTT - ( 29 Apr 2019 18:48 )  PTT:29.7 sec        Assessment:  CP improving  CE noted.  TTE essentially normal.  CPK and EKG w/o change  Risk factors reviewed  pulmonary management continues  Taper steroids  DC plan

## 2019-05-06 NOTE — PROGRESS NOTE ADULT - SUBJECTIVE AND OBJECTIVE BOX
Patient is a 74y old  Male who presents with a chief complaint of chest  pain  copd  excerrbation (02 May 2019 14:06)    HPI: patient  c/o  cough  chest pain  ER  found  to  have  elevated  troponin  admitted  for  further  w/u  and  treatment  discussed with  ER  MD 7:40 pm , he   had  discussed with cardiology patient  was going  to  have  ct/angio   of chest patient  to  be  admitted  to  my  service  by  hospitalist.,  was called 9;54 pm by  ER  RN for  admission orders again  advised  that  patient  was  to have been  admitted to my  service  by  Hospitalist   as  per  protocol. patient  admitted  by  NP  Walker to  telemetry  presently   anxious  no  CP  mild  SOB (30 Apr 2019 08:16)    Still SOB with minimal exertion but more comfortable     REVIEW OF SYSTEMS as above  Constitutional - See HPI  HEENT - No neck pain  Respiratory - See HPI  Cardiovascular - No chest pain, No palpitations  Gastrointestinal - No abdominal pain, No nausea, No vomiting  Genitourinary - No dysuria, No frequency  Neurological - No headaches, No loss of strength, No numbness, No tremors  Skin - No lesions   Endocrine - No temperature intolerance  Musculoskeletal - No joint pain  Psychiatric - No depression, No anxiety    PAST MEDICAL & SURGICAL HISTORY  Hyperglycemia A1c 6.3  COPD with exacerbation  Hypercholesteremia  Pneumonia  Anxiety and depression  COPD (chronic obstructive pulmonary disease  Traumatic amputation L index dist phal.  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  L hand intrinsic atrophy     Psychiatric - Mood somewhat anxious    RECENT LABS reviewed    IMAGING reviewed:  CXR - R basilar infiltrate  CTA - R CA pneumonia, PHTN, unchanged RUL nodule, pHTN, atherosclerotic changes of the aorta  Dr. Forde - R pneumonia, COPD exac., chronic hypoxic resp failure  Dr. Hernandez - essentially normal TTE, CPK & ECG unchanged    FUNCTIONAL HISTORY Ind amb, ADLs with O2    CURRENT FUNCTIONAL STATUS amb 20 feet with RW, O2 but desat to 81% with therapist - felt exhausted for prolonged period of time afterward    IMPRESSION: 61 M Hx Tob, COPD, anxiety, depression, with multiple hospitalizations since 2013 usually for COPD exac, pneumonia - again has pneumonia, COPD exac, stable cardiac W/U.      PLAN: Will benefit from rehab - accepted at [facility].  He is looking forward to program.

## 2019-05-11 DIAGNOSIS — J44.1 CHRONIC OBSTRUCTIVE PULMONARY DISEASE WITH (ACUTE) EXACERBATION: ICD-10-CM

## 2019-05-11 DIAGNOSIS — E78.00 PURE HYPERCHOLESTEROLEMIA, UNSPECIFIED: ICD-10-CM

## 2019-05-11 DIAGNOSIS — Z79.84 LONG TERM (CURRENT) USE OF ORAL HYPOGLYCEMIC DRUGS: ICD-10-CM

## 2019-05-11 DIAGNOSIS — R74.8 ABNORMAL LEVELS OF OTHER SERUM ENZYMES: ICD-10-CM

## 2019-05-11 DIAGNOSIS — J96.11 CHRONIC RESPIRATORY FAILURE WITH HYPOXIA: ICD-10-CM

## 2019-05-11 DIAGNOSIS — E11.65 TYPE 2 DIABETES MELLITUS WITH HYPERGLYCEMIA: ICD-10-CM

## 2019-05-11 DIAGNOSIS — Z99.81 DEPENDENCE ON SUPPLEMENTAL OXYGEN: ICD-10-CM

## 2019-05-11 DIAGNOSIS — F41.9 ANXIETY DISORDER, UNSPECIFIED: ICD-10-CM

## 2019-05-11 DIAGNOSIS — Z87.891 PERSONAL HISTORY OF NICOTINE DEPENDENCE: ICD-10-CM

## 2019-05-11 DIAGNOSIS — R07.9 CHEST PAIN, UNSPECIFIED: ICD-10-CM

## 2019-05-11 DIAGNOSIS — J18.1 LOBAR PNEUMONIA, UNSPECIFIED ORGANISM: ICD-10-CM

## 2019-05-17 ENCOUNTER — OUTPATIENT (OUTPATIENT)
Dept: OUTPATIENT SERVICES | Facility: HOSPITAL | Age: 74
LOS: 1 days | Discharge: HOME | End: 2019-05-17
Payer: MEDICARE

## 2019-05-17 DIAGNOSIS — J44.9 CHRONIC OBSTRUCTIVE PULMONARY DISEASE, UNSPECIFIED: ICD-10-CM

## 2019-05-17 PROCEDURE — 71045 X-RAY EXAM CHEST 1 VIEW: CPT | Mod: 26

## 2021-10-07 NOTE — ED ADULT NURSE NOTE - TEMPLATE LIST FOR HEAD TO TOE ASSESSMENT
percutaneous percutaneous percutaneous percutaneous percutaneous via natural/artificial opening percutaneous percutaneous percutaneous percutaneous percutaneous percutaneous Respiratory percutaneous percutaneous

## 2022-09-09 NOTE — ED ADULT TRIAGE NOTE - SPO2 (%)
97 Prednisone Pregnancy And Lactation Text: This medication is Pregnancy Category C and it isn't know if it is safe during pregnancy. This medication is excreted in breast milk.

## 2023-09-27 NOTE — DISCHARGE NOTE PROVIDER - REASON FOR ADMISSION
normal gait and station , no tenderness or deformities present chest  pain  copd  exacerbation chest  pain  copd  excerrbation

## 2024-01-01 NOTE — ED PROVIDER NOTE - ENMT, MLM
BS: 69 at 2000   Airway patent, Nasal mucosa clear. Mouth with normal mucosa. Throat has no vesicles, no oropharyngeal exudates and uvula is midline.